# Patient Record
Sex: MALE | Race: WHITE | Employment: FULL TIME | URBAN - NONMETROPOLITAN AREA
[De-identification: names, ages, dates, MRNs, and addresses within clinical notes are randomized per-mention and may not be internally consistent; named-entity substitution may affect disease eponyms.]

---

## 2017-06-09 DIAGNOSIS — R60.9 DEPENDENT EDEMA: ICD-10-CM

## 2017-06-09 RX ORDER — FUROSEMIDE 40 MG/1
TABLET ORAL
Qty: 30 TABLET | Refills: 5 | Status: SHIPPED | OUTPATIENT
Start: 2017-06-09 | End: 2017-06-20 | Stop reason: SDUPTHER

## 2017-06-20 ENCOUNTER — OFFICE VISIT (OUTPATIENT)
Dept: FAMILY MEDICINE CLINIC | Age: 62
End: 2017-06-20

## 2017-06-20 VITALS
RESPIRATION RATE: 12 BRPM | BODY MASS INDEX: 45.1 KG/M2 | HEIGHT: 70 IN | TEMPERATURE: 97.7 F | SYSTOLIC BLOOD PRESSURE: 128 MMHG | WEIGHT: 315 LBS | DIASTOLIC BLOOD PRESSURE: 78 MMHG | HEART RATE: 64 BPM

## 2017-06-20 DIAGNOSIS — R63.5 WEIGHT GAIN: ICD-10-CM

## 2017-06-20 DIAGNOSIS — R06.09 EXERTIONAL DYSPNEA: ICD-10-CM

## 2017-06-20 DIAGNOSIS — E66.01 MORBID OBESITY WITH BMI OF 50.0-59.9, ADULT (HCC): ICD-10-CM

## 2017-06-20 DIAGNOSIS — Z13.31 POSITIVE DEPRESSION SCREENING: ICD-10-CM

## 2017-06-20 DIAGNOSIS — R60.9 DEPENDENT EDEMA: ICD-10-CM

## 2017-06-20 DIAGNOSIS — J44.9 CHRONIC OBSTRUCTIVE PULMONARY DISEASE, UNSPECIFIED COPD TYPE (HCC): ICD-10-CM

## 2017-06-20 DIAGNOSIS — L73.2 HIDRADENITIS SUPPURATIVA: Primary | ICD-10-CM

## 2017-06-20 DIAGNOSIS — I89.0 LYMPHEDEMA: ICD-10-CM

## 2017-06-20 DIAGNOSIS — J43.2 CENTRILOBULAR EMPHYSEMA (HCC): ICD-10-CM

## 2017-06-20 PROCEDURE — 4004F PT TOBACCO SCREEN RCVD TLK: CPT | Performed by: FAMILY MEDICINE

## 2017-06-20 PROCEDURE — G8427 DOCREV CUR MEDS BY ELIG CLIN: HCPCS | Performed by: FAMILY MEDICINE

## 2017-06-20 PROCEDURE — G8431 POS CLIN DEPRES SCRN F/U DOC: HCPCS | Performed by: FAMILY MEDICINE

## 2017-06-20 PROCEDURE — 3023F SPIROM DOC REV: CPT | Performed by: FAMILY MEDICINE

## 2017-06-20 PROCEDURE — G8926 SPIRO NO PERF OR DOC: HCPCS | Performed by: FAMILY MEDICINE

## 2017-06-20 PROCEDURE — G8417 CALC BMI ABV UP PARAM F/U: HCPCS | Performed by: FAMILY MEDICINE

## 2017-06-20 PROCEDURE — 99214 OFFICE O/P EST MOD 30 MIN: CPT | Performed by: FAMILY MEDICINE

## 2017-06-20 PROCEDURE — 3017F COLORECTAL CA SCREEN DOC REV: CPT | Performed by: FAMILY MEDICINE

## 2017-06-20 RX ORDER — PREGABALIN 75 MG/1
75 CAPSULE ORAL 2 TIMES DAILY
COMMUNITY
End: 2017-12-20 | Stop reason: ALTCHOICE

## 2017-06-20 RX ORDER — CLINDAMYCIN PHOSPHATE 11.9 MG/ML
SOLUTION TOPICAL
Qty: 60 ML | Refills: 5 | Status: SHIPPED | OUTPATIENT
Start: 2017-06-20 | End: 2017-07-20

## 2017-06-20 RX ORDER — FUROSEMIDE 40 MG/1
40 TABLET ORAL 3 TIMES DAILY
Qty: 60 TABLET | Refills: 5 | COMMUNITY
Start: 2017-06-20 | End: 2017-12-20

## 2017-06-20 ASSESSMENT — PATIENT HEALTH QUESTIONNAIRE - PHQ9
4. FEELING TIRED OR HAVING LITTLE ENERGY: 3
SUM OF ALL RESPONSES TO PHQ QUESTIONS 1-9: 14
3. TROUBLE FALLING OR STAYING ASLEEP: 3
8. MOVING OR SPEAKING SO SLOWLY THAT OTHER PEOPLE COULD HAVE NOTICED. OR THE OPPOSITE, BEING SO FIGETY OR RESTLESS THAT YOU HAVE BEEN MOVING AROUND A LOT MORE THAN USUAL: 1
SUM OF ALL RESPONSES TO PHQ9 QUESTIONS 1 & 2: 4
1. LITTLE INTEREST OR PLEASURE IN DOING THINGS: 3
6. FEELING BAD ABOUT YOURSELF - OR THAT YOU ARE A FAILURE OR HAVE LET YOURSELF OR YOUR FAMILY DOWN: 1
2. FEELING DOWN, DEPRESSED OR HOPELESS: 1
7. TROUBLE CONCENTRATING ON THINGS, SUCH AS READING THE NEWSPAPER OR WATCHING TELEVISION: 1
10. IF YOU CHECKED OFF ANY PROBLEMS, HOW DIFFICULT HAVE THESE PROBLEMS MADE IT FOR YOU TO DO YOUR WORK, TAKE CARE OF THINGS AT HOME, OR GET ALONG WITH OTHER PEOPLE: 1
5. POOR APPETITE OR OVEREATING: 1

## 2017-07-29 LAB
ANION GAP SERPL CALCULATED.3IONS-SCNC: 9 MMOL/L
BUN BLDV-MCNC: 20 MG/DL (ref 10–20)
CALCIUM SERPL-MCNC: 9.3 MG/DL (ref 8.7–10.8)
CHLORIDE BLD-SCNC: 103 MMOL/L (ref 95–111)
CO2: 29 MMOL/L (ref 21–32)
CREAT SERPL-MCNC: 0.8 MG/DL (ref 0.5–1.3)
EGFR AFRICAN AMERICAN: 119
EGFR IF NONAFRICAN AMERICAN: 99
GLUCOSE: 138 MG/DL (ref 70–100)
POTASSIUM SERPL-SCNC: 4.2 MMOL/L (ref 3.5–5.4)
SODIUM BLD-SCNC: 137 MMOL/L (ref 134–147)

## 2017-07-30 LAB — PRO-BNP: 43 PG/ML

## 2017-07-31 ENCOUNTER — TELEPHONE (OUTPATIENT)
Dept: FAMILY MEDICINE CLINIC | Age: 62
End: 2017-07-31

## 2017-07-31 DIAGNOSIS — R73.9 HYPERGLYCEMIA: Primary | ICD-10-CM

## 2017-09-15 LAB
AVERAGE GLUCOSE: 137 MG/DL (ref 66–114)
HBA1C MFR BLD: 6.4 % (ref 4.2–5.8)

## 2017-09-18 ENCOUNTER — TELEPHONE (OUTPATIENT)
Dept: FAMILY MEDICINE CLINIC | Age: 62
End: 2017-09-18

## 2017-12-20 ENCOUNTER — OFFICE VISIT (OUTPATIENT)
Dept: FAMILY MEDICINE CLINIC | Age: 62
End: 2017-12-20
Payer: MEDICARE

## 2017-12-20 VITALS
BODY MASS INDEX: 45.1 KG/M2 | RESPIRATION RATE: 28 BRPM | SYSTOLIC BLOOD PRESSURE: 136 MMHG | HEART RATE: 96 BPM | WEIGHT: 315 LBS | TEMPERATURE: 97.8 F | DIASTOLIC BLOOD PRESSURE: 82 MMHG | HEIGHT: 70 IN

## 2017-12-20 DIAGNOSIS — I10 HTN, GOAL BELOW 140/90: Primary | ICD-10-CM

## 2017-12-20 DIAGNOSIS — L73.2 HIDRADENITIS SUPPURATIVA: ICD-10-CM

## 2017-12-20 DIAGNOSIS — M54.16 LUMBAR RADICULOPATHY: ICD-10-CM

## 2017-12-20 DIAGNOSIS — R60.9 DEPENDENT EDEMA: ICD-10-CM

## 2017-12-20 PROCEDURE — 1036F TOBACCO NON-USER: CPT | Performed by: FAMILY MEDICINE

## 2017-12-20 PROCEDURE — G8417 CALC BMI ABV UP PARAM F/U: HCPCS | Performed by: FAMILY MEDICINE

## 2017-12-20 PROCEDURE — G8427 DOCREV CUR MEDS BY ELIG CLIN: HCPCS | Performed by: FAMILY MEDICINE

## 2017-12-20 PROCEDURE — G8482 FLU IMMUNIZE ORDER/ADMIN: HCPCS | Performed by: FAMILY MEDICINE

## 2017-12-20 PROCEDURE — 3017F COLORECTAL CA SCREEN DOC REV: CPT | Performed by: FAMILY MEDICINE

## 2017-12-20 PROCEDURE — 99214 OFFICE O/P EST MOD 30 MIN: CPT | Performed by: FAMILY MEDICINE

## 2017-12-20 RX ORDER — LISINOPRIL 10 MG/1
10 TABLET ORAL DAILY
COMMUNITY
End: 2017-12-20 | Stop reason: SDUPTHER

## 2017-12-20 RX ORDER — LISINOPRIL 10 MG/1
10 TABLET ORAL DAILY
Qty: 30 TABLET | Refills: 5 | Status: SHIPPED | OUTPATIENT
Start: 2017-12-20 | End: 2018-06-18 | Stop reason: SDUPTHER

## 2017-12-20 RX ORDER — ALBUTEROL SULFATE 90 UG/1
2 AEROSOL, METERED RESPIRATORY (INHALATION) EVERY 6 HOURS PRN
COMMUNITY
End: 2018-07-03 | Stop reason: SDUPTHER

## 2017-12-20 RX ORDER — TRAZODONE HYDROCHLORIDE 50 MG/1
50 TABLET ORAL NIGHTLY
COMMUNITY
End: 2019-03-21

## 2017-12-20 RX ORDER — BUMETANIDE 1 MG/1
1 TABLET ORAL 2 TIMES DAILY
Qty: 60 TABLET | Refills: 3 | Status: SHIPPED | OUTPATIENT
Start: 2017-12-20 | End: 2018-01-10 | Stop reason: SDUPTHER

## 2017-12-20 RX ORDER — AMITRIPTYLINE HYDROCHLORIDE 50 MG/1
50 TABLET, FILM COATED ORAL NIGHTLY
Qty: 30 TABLET | Refills: 5 | Status: SHIPPED | OUTPATIENT
Start: 2017-12-20 | End: 2019-03-21

## 2017-12-20 RX ORDER — AMITRIPTYLINE HYDROCHLORIDE 25 MG/1
50 TABLET, FILM COATED ORAL NIGHTLY
COMMUNITY
End: 2017-12-20 | Stop reason: SDUPTHER

## 2017-12-20 RX ORDER — SULFAMETHOXAZOLE AND TRIMETHOPRIM 800; 160 MG/1; MG/1
1 TABLET ORAL DAILY
Qty: 42 TABLET | Refills: 0 | Status: SHIPPED | OUTPATIENT
Start: 2017-12-20 | End: 2018-01-31

## 2017-12-20 RX ORDER — BACLOFEN 10 MG/1
10 TABLET ORAL EVERY EVENING
COMMUNITY
End: 2021-05-26

## 2017-12-20 NOTE — PROGRESS NOTES
Visit Information    Have you changed or started any medications since your last visit including any over-the-counter medicines, vitamins, or herbal medicines? no   Are you having any side effects from any of your medications? -  no  Have you stopped taking any of your medications? Is so, why? -  no    Have you seen any other physician or provider since your last visit? Yes - Records Obtained  Have you had any other diagnostic tests since your last visit? Yes - Records Obtained  Have you been seen in the emergency room and/or had an admission to a hospital since we last saw you? Yes - Records Obtained  Have you had your routine dental cleaning in the past 6 months? no    Have you activated your [employer] account? If not, what are your barriers? No     Patient Care Team:  Max Sever, DO as PCP - General (Family Medicine)  Max Sever,  as PCP - [facility] Attributed Provider    Medical History Review  Past Medical, Family, and Social History     Defer to provider.

## 2017-12-20 NOTE — PROGRESS NOTES
HISTORY      Had face surgery at [address] [place] [address] Left 2007    Dr. Marcelino Agent History   Substance Use Topics    Smoking status: Former Smoker     Packs/day: 0.50     Years: 40.00     Types: Cigarettes     Start date: 6/10/1974     Quit date: 3/20/2017    Smokeless tobacco: Never Used      Comment: information given at past appts    Alcohol use Yes      Comment: very little       Family History   Problem Relation Age of Onset    Arthritis Father     Stroke Father     Heart Attack Father          I have reviewed the patient's past medical history, past surgical history, allergies, medications, social and family history and I have made updates where appropriate. ROS        PHYSICAL EXAM:  /82   Pulse 96   Temp 97.8 °F (36.6 °C) (Oral)   Resp 28   Ht 5' 10\" (1.778 m)   Wt (!) 424 lb (192.3 kg)   BMI 60.84 kg/m²     General Appearance: well developed and well- nourished, in no acute distress  Head: normocephalic and atraumatic  ENT: external ear and ear canal normal bilaterally, nose without deformity, nasal mucosa and turbinates normal without polyps, oropharynx normal, dentition is normal for age, no lip or gum lesions noted  Neck: supple and non-tender without mass, no thyromegaly or thyroid nodules, no cervical lymphadenopathy  Pulmonary/Chest: clear to auscultation bilaterally- no wheezes, rales or rhonchi, normal air movement, no respiratory distress or retractions  Cardiovascular: normal rate, regular rhythm, normal S1 and S2, no murmurs, rubs, clicks, or gallops, distal pulses intact  Extremities: no cyanosis, clubbing or edema of the lower extremities  Psych:  Normal affect without evidence of depression or anxiety, insight and judgement are appropriate, memory appears intact  Skin: warm and dry, no rash or erythema, 1.1 cm flesh color      ASSESSMENT & PLAN  Kenzie Walter was seen today for 6 month follow-up, other, leg pain and other.     Diagnoses and all orders for this visit:    HTN, goal below 140/90  -     lisinopril (PRINIVIL;ZESTRIL) 10 MG tablet; Take 1 tablet by mouth daily    Dependent edema  -     bumetanide (BUMEX) 1 MG tablet; Take 1 tablet by mouth 2 times daily    Hidradenitis suppurativa  -     sulfamethoxazole-trimethoprim (BACTRIM DS) 800-160 MG per tablet; Take 1 tablet by mouth daily    Lumbar radiculopathy  -     amitriptyline (ELAVIL) 50 MG tablet; Take 1 tablet by mouth nightly  -     Handicap Placard MISC; by Does not apply route Expires 12/20/2022    -He feels like the lasix is no longer effective, will trial switching to Bumex and recheck in 3 weeks  -Will remove skin lesion at next visit  -Stop topical clindamycin, will start daily Bactrim x 6 weeks  -Recheck in 3 weeks    Return in about 3 weeks (around 1/10/2018), or if symptoms worsen or fail to improve. Juanis Nichols received counseling on the following healthy behaviors: medication adherence  Reviewed prior labs and health maintenance. Continue current medications, diet and exercise. Discussed use, benefit, and side effects of prescribed medications. Barriers to medication compliance addressed. Patient given educational materials - see patient instructions. All patient questions answered. Patient voiced understanding.

## 2017-12-20 NOTE — PROGRESS NOTES
Subjective:      Patient ID: Daniel Castro is a 63 y.o. male.     HPI    Review of Systems    Objective:   Physical Exam    Assessment:      ***      Plan:      ***

## 2018-01-10 ENCOUNTER — OFFICE VISIT (OUTPATIENT)
Dept: FAMILY MEDICINE CLINIC | Age: 63
End: 2018-01-10
Payer: MEDICARE

## 2018-01-10 VITALS
BODY MASS INDEX: 45.1 KG/M2 | RESPIRATION RATE: 14 BRPM | TEMPERATURE: 98.1 F | HEART RATE: 76 BPM | DIASTOLIC BLOOD PRESSURE: 92 MMHG | HEIGHT: 70 IN | SYSTOLIC BLOOD PRESSURE: 136 MMHG | WEIGHT: 315 LBS

## 2018-01-10 DIAGNOSIS — R60.9 DEPENDENT EDEMA: ICD-10-CM

## 2018-01-10 DIAGNOSIS — E66.01 MORBID OBESITY WITH BMI OF 60.0-69.9, ADULT (HCC): ICD-10-CM

## 2018-01-10 DIAGNOSIS — J44.9 COPD WITH CHRONIC BRONCHITIS (HCC): ICD-10-CM

## 2018-01-10 DIAGNOSIS — L98.9 SKIN LESION OF NECK: Primary | ICD-10-CM

## 2018-01-10 PROCEDURE — G8482 FLU IMMUNIZE ORDER/ADMIN: HCPCS | Performed by: FAMILY MEDICINE

## 2018-01-10 PROCEDURE — G8926 SPIRO NO PERF OR DOC: HCPCS | Performed by: FAMILY MEDICINE

## 2018-01-10 PROCEDURE — 3017F COLORECTAL CA SCREEN DOC REV: CPT | Performed by: FAMILY MEDICINE

## 2018-01-10 PROCEDURE — G8417 CALC BMI ABV UP PARAM F/U: HCPCS | Performed by: FAMILY MEDICINE

## 2018-01-10 PROCEDURE — 11312 SHAVE SKIN LESION 1.1-2.0 CM: CPT | Performed by: FAMILY MEDICINE

## 2018-01-10 PROCEDURE — 99213 OFFICE O/P EST LOW 20 MIN: CPT | Performed by: FAMILY MEDICINE

## 2018-01-10 PROCEDURE — G8428 CUR MEDS NOT DOCUMENT: HCPCS | Performed by: FAMILY MEDICINE

## 2018-01-10 PROCEDURE — 3023F SPIROM DOC REV: CPT | Performed by: FAMILY MEDICINE

## 2018-01-10 PROCEDURE — 1036F TOBACCO NON-USER: CPT | Performed by: FAMILY MEDICINE

## 2018-01-10 RX ORDER — BUMETANIDE 1 MG/1
TABLET ORAL
Qty: 60 TABLET | Refills: 3 | Status: SHIPPED | OUTPATIENT
Start: 2018-01-10 | End: 2018-04-02 | Stop reason: SDUPTHER

## 2018-01-10 NOTE — PROGRESS NOTES
Visit Information    Have you changed or started any medications since your last visit including any over-the-counter medicines, vitamins, or herbal medicines? no   Are you having any side effects from any of your medications? -  no  Have you stopped taking any of your medications? Is so, why? -  no    Have you seen any other physician or provider since your last visit? No  Have you had any other diagnostic tests since your last visit? No  Have you been seen in the emergency room and/or had an admission to a hospital since we last saw you? No  Have you had your routine dental cleaning in the past 6 months? no    Have you activated your [employer] account? If not, what are your barriers? Yes     Patient Care Team:  Marilou Whiteside, DO as PCP - General (Family Medicine)  Marilou Whiteside, DO as PCP - [facility] Attributed Provider    Medical History Review  Past Medical, Family, and Social History     Defer to provider.

## 2018-01-12 ENCOUNTER — TELEPHONE (OUTPATIENT)
Dept: FAMILY MEDICINE CLINIC | Age: 63
End: 2018-01-12

## 2018-01-25 ENCOUNTER — TELEPHONE (OUTPATIENT)
Dept: FAMILY MEDICINE CLINIC | Age: 63
End: 2018-01-25

## 2018-01-25 DIAGNOSIS — R73.9 HYPERGLYCEMIA: Primary | ICD-10-CM

## 2018-01-25 LAB
ANION GAP SERPL CALCULATED.3IONS-SCNC: 11 MMOL/L
BUN BLDV-MCNC: 14 MG/DL (ref 10–20)
CALCIUM SERPL-MCNC: 9.3 MG/DL (ref 8.7–10.8)
CHLORIDE BLD-SCNC: 99 MMOL/L (ref 95–111)
CO2: 31 MMOL/L (ref 21–32)
CREAT SERPL-MCNC: 0.8 MG/DL (ref 0.5–1.3)
EGFR AFRICAN AMERICAN: 119
EGFR IF NONAFRICAN AMERICAN: 99
GLUCOSE: 245 MG/DL (ref 70–100)
POTASSIUM SERPL-SCNC: 4.3 MMOL/L (ref 3.5–5.4)
SODIUM BLD-SCNC: 137 MMOL/L (ref 134–147)

## 2018-01-25 NOTE — TELEPHONE ENCOUNTER
Pt informed, and requested to have lab order faxed to [address] [place] [address] lab at fax [phone]. Order faxed.

## 2018-02-01 ENCOUNTER — TELEPHONE (OUTPATIENT)
Dept: FAMILY MEDICINE CLINIC | Age: 63
End: 2018-02-01

## 2018-02-01 DIAGNOSIS — E11.9 TYPE 2 DIABETES MELLITUS WITHOUT COMPLICATION, WITHOUT LONG-TERM CURRENT USE OF INSULIN (HCC): Primary | ICD-10-CM

## 2018-02-01 LAB
AVERAGE GLUCOSE: 212 MG/DL (ref 66–114)
HBA1C MFR BLD: 9 % (ref 4.2–5.8)

## 2018-02-01 NOTE — TELEPHONE ENCOUNTER
Pt's wife Dariusz Barbosa (on signed hipaa) informed and verbalized understanding. appt scheduled for 1 month.

## 2018-03-05 ENCOUNTER — OFFICE VISIT (OUTPATIENT)
Dept: FAMILY MEDICINE CLINIC | Age: 63
End: 2018-03-05
Payer: MEDICARE

## 2018-03-05 VITALS
WEIGHT: 315 LBS | SYSTOLIC BLOOD PRESSURE: 138 MMHG | RESPIRATION RATE: 20 BRPM | DIASTOLIC BLOOD PRESSURE: 76 MMHG | BODY MASS INDEX: 45.1 KG/M2 | HEART RATE: 80 BPM | TEMPERATURE: 97.9 F | HEIGHT: 70 IN

## 2018-03-05 DIAGNOSIS — R60.9 DEPENDENT EDEMA: ICD-10-CM

## 2018-03-05 DIAGNOSIS — E11.29 MICROALBUMINURIA DUE TO TYPE 2 DIABETES MELLITUS (HCC): ICD-10-CM

## 2018-03-05 DIAGNOSIS — T50.905A ADVERSE EFFECT OF DRUG, INITIAL ENCOUNTER: ICD-10-CM

## 2018-03-05 DIAGNOSIS — R80.9 MICROALBUMINURIA DUE TO TYPE 2 DIABETES MELLITUS (HCC): ICD-10-CM

## 2018-03-05 DIAGNOSIS — E11.9 TYPE 2 DIABETES MELLITUS WITHOUT COMPLICATION, WITHOUT LONG-TERM CURRENT USE OF INSULIN (HCC): Primary | ICD-10-CM

## 2018-03-05 DIAGNOSIS — E66.01 MORBID OBESITY WITH BMI OF 50.0-59.9, ADULT (HCC): ICD-10-CM

## 2018-03-05 LAB
CREATININE URINE POCT: ABNORMAL
MICROALBUMIN/CREAT 24H UR: ABNORMAL MG/G{CREAT}
MICROALBUMIN/CREAT UR-RTO: ABNORMAL

## 2018-03-05 PROCEDURE — 1036F TOBACCO NON-USER: CPT | Performed by: FAMILY MEDICINE

## 2018-03-05 PROCEDURE — 3017F COLORECTAL CA SCREEN DOC REV: CPT | Performed by: FAMILY MEDICINE

## 2018-03-05 PROCEDURE — 99214 OFFICE O/P EST MOD 30 MIN: CPT | Performed by: FAMILY MEDICINE

## 2018-03-05 PROCEDURE — 82044 UR ALBUMIN SEMIQUANTITATIVE: CPT | Performed by: FAMILY MEDICINE

## 2018-03-05 PROCEDURE — 3045F PR MOST RECENT HEMOGLOBIN A1C LEVEL 7.0-9.0%: CPT | Performed by: FAMILY MEDICINE

## 2018-03-05 PROCEDURE — G8427 DOCREV CUR MEDS BY ELIG CLIN: HCPCS | Performed by: FAMILY MEDICINE

## 2018-03-05 PROCEDURE — G8482 FLU IMMUNIZE ORDER/ADMIN: HCPCS | Performed by: FAMILY MEDICINE

## 2018-03-05 PROCEDURE — G8417 CALC BMI ABV UP PARAM F/U: HCPCS | Performed by: FAMILY MEDICINE

## 2018-03-05 NOTE — PROGRESS NOTES
- Tdap) 05/02/1976    Shingles Vaccine (1 of 2 - 2 Dose Series) 05/02/2007    A1C test (Diabetic or Prediabetic)  04/29/2018    Potassium monitoring  01/24/2019    Creatinine monitoring  01/24/2019    Colon cancer screen colonoscopy  04/17/2020    Flu vaccine  Completed    Pneumococcal med risk  Completed       Past Medical History:   Diagnosis Date    Asthma     CHF (congestive heart failure) (HCC)     Chronic back pain     Chronic neck pain     Obesity        Past Surgical History:   Procedure Laterality Date    COLONOSCOPY  4/17/15    OTHER SURGICAL HISTORY      Had face surgery at [address] [place] [address] 2007    Dr. Aditi King       Social History   Substance Use Topics    Smoking status: Former Smoker     Packs/day: 0.50     Years: 40.00     Types: Cigarettes     Start date: 6/10/1974     Quit date: 3/20/2017    Smokeless tobacco: Never Used      Comment: information given at past appts    Alcohol use Yes      Comment: very little       Family History   Problem Relation Age of Onset    Arthritis Father     Stroke Father     Heart Attack Father          I have reviewed the patient's past medical history, past surgical history, allergies, medications, social and family history and I have made updates where appropriate.     ROS        PHYSICAL EXAM:  /76   Pulse 80   Temp 97.9 °F (36.6 °C) (Oral)   Resp 20   Ht 5' 10\" (1.778 m)   Wt (!) 417 lb (189.1 kg)   BMI 59.83 kg/m²     General Appearance: well developed and well- nourished, in no acute distress  Head: normocephalic and atraumatic  ENT: external ear and ear canal normal bilaterally, nose without deformity, nasal mucosa and turbinates normal without polyps, oropharynx normal, dentition is normal for age, no lip or gum lesions noted  Neck: supple and non-tender without mass, no thyromegaly or thyroid nodules, no cervical lymphadenopathy  Pulmonary/Chest: clear to auscultation bilaterally- no wheezes, rales or rhonchi, normal air movement, no respiratory distress or retractions  Cardiovascular: normal rate, regular rhythm, normal S1 and S2, no murmurs, rubs, clicks, or gallops, distal pulses intact  Extremities: no cyanosis, clubbing of the lower extremities, chronic severe pitting edema of the LEs bilaterally  Psych:  Normal affect without evidence of depression or anxiety, insight and judgement are appropriate, memory appears intact  Skin: warm and dry, no rash or erythema      ASSESSMENT & PLAN  Clare Iraheta was seen today for follow-up. Diagnoses and all orders for this visit:    Type 2 diabetes mellitus without complication, without long-term current use of insulin ([facility])  -     POCT microalbumin    Microalbuminuria due to type 2 diabetes mellitus ([facility] [address]    Morbid obesity with BMI of 50.0-59.9, adult ([facility])    Adverse effect of drug, initial encounter    Dependent edema    -Continue Bumex at BID dosing for now  -Due to diarrhea from metformin, advised to cut back to 500mg BID, recheck a1c in 2 months  -Long discussion today with patient regarding his weight. We did discuss possible referral to weight management. He wants to hold off on this now, but is not ruling it out if he is not able to make significant progress on his own. Return in about 2 months (around 5/5/2018). Clare Iraheta received counseling on the following healthy behaviors: medication adherence  Reviewed prior labs and health maintenance. Continue current medications, diet and exercise. Discussed use, benefit, and side effects of prescribed medications. Barriers to medication compliance addressed. Patient given educational materials - see patient instructions. All patient questions answered. Patient voiced understanding.

## 2018-04-02 DIAGNOSIS — R60.9 DEPENDENT EDEMA: ICD-10-CM

## 2018-04-02 RX ORDER — BUMETANIDE 1 MG/1
TABLET ORAL
Qty: 90 TABLET | Refills: 3 | Status: SHIPPED | OUTPATIENT
Start: 2018-04-02 | End: 2018-07-28 | Stop reason: SDUPTHER

## 2018-05-03 ENCOUNTER — OFFICE VISIT (OUTPATIENT)
Dept: FAMILY MEDICINE CLINIC | Age: 63
End: 2018-05-03
Payer: MEDICARE

## 2018-05-03 VITALS
TEMPERATURE: 98 F | HEART RATE: 71 BPM | SYSTOLIC BLOOD PRESSURE: 128 MMHG | HEIGHT: 70 IN | BODY MASS INDEX: 45.1 KG/M2 | WEIGHT: 315 LBS | RESPIRATION RATE: 20 BRPM | DIASTOLIC BLOOD PRESSURE: 89 MMHG

## 2018-05-03 DIAGNOSIS — E11.9 TYPE 2 DIABETES MELLITUS WITHOUT COMPLICATION, WITHOUT LONG-TERM CURRENT USE OF INSULIN (HCC): Primary | ICD-10-CM

## 2018-05-03 DIAGNOSIS — E66.1 CLASS 3 DRUG-INDUCED OBESITY WITH SERIOUS COMORBIDITY AND BODY MASS INDEX (BMI) OF 60.0 TO 69.9 IN ADULT (HCC): ICD-10-CM

## 2018-05-03 DIAGNOSIS — R60.9 DEPENDENT EDEMA: ICD-10-CM

## 2018-05-03 LAB — HBA1C MFR BLD: 7.5 %

## 2018-05-03 PROCEDURE — 3017F COLORECTAL CA SCREEN DOC REV: CPT | Performed by: FAMILY MEDICINE

## 2018-05-03 PROCEDURE — 83036 HEMOGLOBIN GLYCOSYLATED A1C: CPT | Performed by: FAMILY MEDICINE

## 2018-05-03 PROCEDURE — 1036F TOBACCO NON-USER: CPT | Performed by: FAMILY MEDICINE

## 2018-05-03 PROCEDURE — G8417 CALC BMI ABV UP PARAM F/U: HCPCS | Performed by: FAMILY MEDICINE

## 2018-05-03 PROCEDURE — G8427 DOCREV CUR MEDS BY ELIG CLIN: HCPCS | Performed by: FAMILY MEDICINE

## 2018-05-03 PROCEDURE — 3045F PR MOST RECENT HEMOGLOBIN A1C LEVEL 7.0-9.0%: CPT | Performed by: FAMILY MEDICINE

## 2018-05-03 PROCEDURE — 99214 OFFICE O/P EST MOD 30 MIN: CPT | Performed by: FAMILY MEDICINE

## 2018-05-03 PROCEDURE — 2022F DILAT RTA XM EVC RTNOPTHY: CPT | Performed by: FAMILY MEDICINE

## 2018-06-14 ENCOUNTER — OFFICE VISIT (OUTPATIENT)
Dept: FAMILY MEDICINE CLINIC | Age: 63
End: 2018-06-14
Payer: MEDICARE

## 2018-06-14 VITALS
SYSTOLIC BLOOD PRESSURE: 140 MMHG | TEMPERATURE: 98 F | WEIGHT: 315 LBS | BODY MASS INDEX: 45.1 KG/M2 | DIASTOLIC BLOOD PRESSURE: 89 MMHG | HEART RATE: 72 BPM | RESPIRATION RATE: 24 BRPM | HEIGHT: 70 IN

## 2018-06-14 DIAGNOSIS — R53.83 OTHER FATIGUE: ICD-10-CM

## 2018-06-14 DIAGNOSIS — R60.0 LOWER EXTREMITY EDEMA: ICD-10-CM

## 2018-06-14 DIAGNOSIS — R06.02 SHORTNESS OF BREATH: Primary | ICD-10-CM

## 2018-06-14 PROCEDURE — G8417 CALC BMI ABV UP PARAM F/U: HCPCS | Performed by: FAMILY MEDICINE

## 2018-06-14 PROCEDURE — 3017F COLORECTAL CA SCREEN DOC REV: CPT | Performed by: FAMILY MEDICINE

## 2018-06-14 PROCEDURE — 99214 OFFICE O/P EST MOD 30 MIN: CPT | Performed by: FAMILY MEDICINE

## 2018-06-14 PROCEDURE — G8427 DOCREV CUR MEDS BY ELIG CLIN: HCPCS | Performed by: FAMILY MEDICINE

## 2018-06-14 PROCEDURE — 1036F TOBACCO NON-USER: CPT | Performed by: FAMILY MEDICINE

## 2018-06-14 PROCEDURE — 93000 ELECTROCARDIOGRAM COMPLETE: CPT | Performed by: FAMILY MEDICINE

## 2018-06-14 RX ORDER — PREGABALIN 50 MG/1
75 CAPSULE ORAL 3 TIMES DAILY
COMMUNITY
End: 2022-01-04

## 2018-06-15 ENCOUNTER — TELEPHONE (OUTPATIENT)
Dept: FAMILY MEDICINE CLINIC | Age: 63
End: 2018-06-15

## 2018-06-18 DIAGNOSIS — I10 HTN, GOAL BELOW 140/90: ICD-10-CM

## 2018-06-18 RX ORDER — LISINOPRIL 10 MG/1
10 TABLET ORAL DAILY
Qty: 30 TABLET | Refills: 5 | Status: SHIPPED | OUTPATIENT
Start: 2018-06-18 | End: 2018-12-10 | Stop reason: SDUPTHER

## 2018-06-26 LAB
ANION GAP SERPL CALCULATED.3IONS-SCNC: 12 MEQ/L (ref 10–19)
BUN BLDV-MCNC: 11 MG/DL (ref 8–23)
CALCIUM SERPL-MCNC: 8.5 MG/DL (ref 8.5–10.5)
CHLORIDE BLD-SCNC: 103 MEQ/L (ref 95–107)
CO2: 25 MEQ/L (ref 19–31)
CREAT SERPL-MCNC: 0.6 MG/DL (ref 0.8–1.4)
EGFR AFRICAN AMERICAN: 125.8 ML/MIN/1.73 M2
EGFR IF NONAFRICAN AMERICAN: 108.5 ML/MIN/1.73 M2
GLUCOSE: 119 MG/DL (ref 70–99)
POTASSIUM SERPL-SCNC: 4.5 MEQ/L (ref 3.5–5.4)
SODIUM BLD-SCNC: 140 MEQ/L (ref 135–146)
TSH SERPL DL<=0.05 MIU/L-ACNC: 2.44 UIU/ML (ref 0.4–4.1)

## 2018-06-27 ENCOUNTER — TELEPHONE (OUTPATIENT)
Dept: FAMILY MEDICINE CLINIC | Age: 63
End: 2018-06-27

## 2018-06-27 LAB — B-TYPE NATRIURETIC PEPTIDE: 53 PG/ML (ref 0–100)

## 2018-07-02 ENCOUNTER — TELEPHONE (OUTPATIENT)
Dept: FAMILY MEDICINE CLINIC | Age: 63
End: 2018-07-02

## 2018-07-02 DIAGNOSIS — R06.09 EXERTIONAL DYSPNEA: Primary | ICD-10-CM

## 2018-07-03 ENCOUNTER — TELEPHONE (OUTPATIENT)
Dept: FAMILY MEDICINE CLINIC | Age: 63
End: 2018-07-03

## 2018-07-03 DIAGNOSIS — J44.9 CHRONIC OBSTRUCTIVE PULMONARY DISEASE, UNSPECIFIED COPD TYPE (HCC): Primary | ICD-10-CM

## 2018-07-03 RX ORDER — ALBUTEROL SULFATE 90 UG/1
2 AEROSOL, METERED RESPIRATORY (INHALATION) EVERY 6 HOURS PRN
Qty: 1 INHALER | Refills: 5 | Status: SHIPPED | OUTPATIENT
Start: 2018-07-03 | End: 2018-07-03

## 2018-07-03 RX ORDER — ALBUTEROL SULFATE 90 UG/1
2 AEROSOL, METERED RESPIRATORY (INHALATION) EVERY 6 HOURS PRN
Qty: 3 INHALER | Refills: 3 | Status: SHIPPED | OUTPATIENT
Start: 2018-07-03 | End: 2019-05-28 | Stop reason: SDUPTHER

## 2018-07-16 ENCOUNTER — TELEPHONE (OUTPATIENT)
Dept: FAMILY MEDICINE CLINIC | Age: 63
End: 2018-07-16

## 2018-07-16 NOTE — TELEPHONE ENCOUNTER
----- Message from Allie Perez DO sent at 7/16/2018 12:11 PM EDT -----  Please let pt know that chest xray is clear. Let me know if questions, thanks!

## 2018-07-18 ENCOUNTER — TELEPHONE (OUTPATIENT)
Dept: CARE COORDINATION | Age: 63
End: 2018-07-18

## 2018-07-28 DIAGNOSIS — E11.9 TYPE 2 DIABETES MELLITUS WITHOUT COMPLICATION, WITHOUT LONG-TERM CURRENT USE OF INSULIN (HCC): ICD-10-CM

## 2018-07-28 DIAGNOSIS — R60.9 DEPENDENT EDEMA: ICD-10-CM

## 2018-07-28 RX ORDER — BUMETANIDE 1 MG/1
TABLET ORAL
Qty: 90 TABLET | Refills: 3 | Status: SHIPPED | OUTPATIENT
Start: 2018-07-28 | End: 2018-11-29 | Stop reason: SDUPTHER

## 2018-09-04 ENCOUNTER — TELEPHONE (OUTPATIENT)
Dept: FAMILY MEDICINE CLINIC | Age: 63
End: 2018-09-04

## 2018-09-04 ENCOUNTER — OFFICE VISIT (OUTPATIENT)
Dept: FAMILY MEDICINE CLINIC | Age: 63
End: 2018-09-04
Payer: MEDICARE

## 2018-09-04 VITALS
DIASTOLIC BLOOD PRESSURE: 84 MMHG | WEIGHT: 315 LBS | BODY MASS INDEX: 44.1 KG/M2 | HEART RATE: 68 BPM | HEIGHT: 71 IN | RESPIRATION RATE: 14 BRPM | TEMPERATURE: 98.1 F | SYSTOLIC BLOOD PRESSURE: 138 MMHG

## 2018-09-04 DIAGNOSIS — R60.9 DEPENDENT EDEMA: ICD-10-CM

## 2018-09-04 DIAGNOSIS — E66.01 MORBID OBESITY WITH BMI OF 50.0-59.9, ADULT (HCC): ICD-10-CM

## 2018-09-04 DIAGNOSIS — I10 HTN, GOAL BELOW 140/90: ICD-10-CM

## 2018-09-04 DIAGNOSIS — E11.9 TYPE 2 DIABETES MELLITUS WITHOUT COMPLICATION, WITHOUT LONG-TERM CURRENT USE OF INSULIN (HCC): Primary | ICD-10-CM

## 2018-09-04 LAB — HBA1C MFR BLD: 6.1 %

## 2018-09-04 PROCEDURE — 3044F HG A1C LEVEL LT 7.0%: CPT | Performed by: FAMILY MEDICINE

## 2018-09-04 PROCEDURE — 2022F DILAT RTA XM EVC RTNOPTHY: CPT | Performed by: FAMILY MEDICINE

## 2018-09-04 PROCEDURE — G8427 DOCREV CUR MEDS BY ELIG CLIN: HCPCS | Performed by: FAMILY MEDICINE

## 2018-09-04 PROCEDURE — G8417 CALC BMI ABV UP PARAM F/U: HCPCS | Performed by: FAMILY MEDICINE

## 2018-09-04 PROCEDURE — 99214 OFFICE O/P EST MOD 30 MIN: CPT | Performed by: FAMILY MEDICINE

## 2018-09-04 PROCEDURE — 1036F TOBACCO NON-USER: CPT | Performed by: FAMILY MEDICINE

## 2018-09-04 PROCEDURE — 83036 HEMOGLOBIN GLYCOSYLATED A1C: CPT | Performed by: FAMILY MEDICINE

## 2018-09-04 PROCEDURE — 3017F COLORECTAL CA SCREEN DOC REV: CPT | Performed by: FAMILY MEDICINE

## 2018-09-04 ASSESSMENT — PATIENT HEALTH QUESTIONNAIRE - PHQ9
2. FEELING DOWN, DEPRESSED OR HOPELESS: 1
SUM OF ALL RESPONSES TO PHQ9 QUESTIONS 1 & 2: 2
SUM OF ALL RESPONSES TO PHQ QUESTIONS 1-9: 2
1. LITTLE INTEREST OR PLEASURE IN DOING THINGS: 1
SUM OF ALL RESPONSES TO PHQ QUESTIONS 1-9: 2

## 2018-10-02 ENCOUNTER — CARE COORDINATION (OUTPATIENT)
Dept: CARE COORDINATION | Age: 63
End: 2018-10-02

## 2018-11-28 ENCOUNTER — CARE COORDINATION (OUTPATIENT)
Dept: CARE COORDINATION | Age: 63
End: 2018-11-28

## 2018-11-29 ENCOUNTER — TELEPHONE (OUTPATIENT)
Dept: FAMILY MEDICINE CLINIC | Age: 63
End: 2018-11-29

## 2018-11-29 ENCOUNTER — CARE COORDINATION (OUTPATIENT)
Dept: CARE COORDINATION | Age: 63
End: 2018-11-29

## 2018-11-29 DIAGNOSIS — R60.9 DEPENDENT EDEMA: ICD-10-CM

## 2018-11-29 RX ORDER — BUMETANIDE 1 MG/1
TABLET ORAL
Qty: 90 TABLET | Refills: 5 | Status: SHIPPED | OUTPATIENT
Start: 2018-11-29 | End: 2019-05-31 | Stop reason: SDUPTHER

## 2018-11-29 NOTE — TELEPHONE ENCOUNTER
11/29/18  [facility] received an alert and spoke to patient daughter who said patient is having slight SOB, increased bilateral lower leg edema (very mushy per daughter), constipation and weight gain, however they are not weighing him. Patient is taking Lyrica thru [employer]  who denied any association with symptoms. Odell Goodell advised that she was unable to tell patient to DC the medicine. She scheduled pt for 12/20/18  (declined sooner) with [facility].   Shawn/shani  Dolv: 9/4/18

## 2018-11-29 NOTE — TELEPHONE ENCOUNTER
Patients pharmacy [facility] sent a refill request for the patients Bumetanide 1 mg tablets. Patient takes 2 tablets by mouth every morning and 1 tablet by mouth at bedtime. Please advise.

## 2018-12-10 DIAGNOSIS — I10 HTN, GOAL BELOW 140/90: ICD-10-CM

## 2018-12-10 RX ORDER — LISINOPRIL 10 MG/1
10 TABLET ORAL DAILY
Qty: 90 TABLET | Refills: 3 | Status: SHIPPED | OUTPATIENT
Start: 2018-12-10 | End: 2022-10-21

## 2018-12-20 ENCOUNTER — OFFICE VISIT (OUTPATIENT)
Dept: FAMILY MEDICINE CLINIC | Age: 63
End: 2018-12-20
Payer: MEDICARE

## 2018-12-20 VITALS
WEIGHT: 315 LBS | RESPIRATION RATE: 18 BRPM | HEIGHT: 72 IN | DIASTOLIC BLOOD PRESSURE: 84 MMHG | SYSTOLIC BLOOD PRESSURE: 136 MMHG | TEMPERATURE: 98.3 F | BODY MASS INDEX: 42.66 KG/M2 | HEART RATE: 88 BPM

## 2018-12-20 DIAGNOSIS — E66.01 OBESITY, MORBID, BMI 50 OR HIGHER (HCC): ICD-10-CM

## 2018-12-20 DIAGNOSIS — L73.2 HIDRADENITIS SUPPURATIVA: Primary | ICD-10-CM

## 2018-12-20 DIAGNOSIS — R60.0 LOWER EXTREMITY EDEMA: ICD-10-CM

## 2018-12-20 DIAGNOSIS — E11.9 TYPE 2 DIABETES MELLITUS WITHOUT COMPLICATION, WITHOUT LONG-TERM CURRENT USE OF INSULIN (HCC): ICD-10-CM

## 2018-12-20 PROCEDURE — 2022F DILAT RTA XM EVC RTNOPTHY: CPT | Performed by: FAMILY MEDICINE

## 2018-12-20 PROCEDURE — 99214 OFFICE O/P EST MOD 30 MIN: CPT | Performed by: FAMILY MEDICINE

## 2018-12-20 PROCEDURE — 1036F TOBACCO NON-USER: CPT | Performed by: FAMILY MEDICINE

## 2018-12-20 PROCEDURE — 3017F COLORECTAL CA SCREEN DOC REV: CPT | Performed by: FAMILY MEDICINE

## 2018-12-20 PROCEDURE — G8427 DOCREV CUR MEDS BY ELIG CLIN: HCPCS | Performed by: FAMILY MEDICINE

## 2018-12-20 PROCEDURE — G8484 FLU IMMUNIZE NO ADMIN: HCPCS | Performed by: FAMILY MEDICINE

## 2018-12-20 PROCEDURE — G8417 CALC BMI ABV UP PARAM F/U: HCPCS | Performed by: FAMILY MEDICINE

## 2018-12-20 PROCEDURE — 3044F HG A1C LEVEL LT 7.0%: CPT | Performed by: FAMILY MEDICINE

## 2018-12-20 RX ORDER — PHENTERMINE HYDROCHLORIDE 37.5 MG/1
37.5 TABLET ORAL
Qty: 30 TABLET | Refills: 0 | Status: SHIPPED | OUTPATIENT
Start: 2018-12-20 | End: 2019-01-22 | Stop reason: SDUPTHER

## 2018-12-20 RX ORDER — CLINDAMYCIN PHOSPHATE 11.9 MG/ML
SOLUTION TOPICAL
Qty: 30 ML | Refills: 3 | Status: SHIPPED | OUTPATIENT
Start: 2018-12-20 | End: 2019-01-19

## 2019-01-22 ENCOUNTER — NURSE ONLY (OUTPATIENT)
Dept: FAMILY MEDICINE CLINIC | Age: 64
End: 2019-01-22

## 2019-01-22 VITALS — WEIGHT: 315 LBS | BODY MASS INDEX: 53.16 KG/M2

## 2019-01-22 DIAGNOSIS — E66.01 OBESITY, MORBID, BMI 50 OR HIGHER (HCC): Primary | ICD-10-CM

## 2019-01-22 DIAGNOSIS — E66.01 OBESITY, MORBID, BMI 50 OR HIGHER (HCC): ICD-10-CM

## 2019-01-22 RX ORDER — PHENTERMINE HYDROCHLORIDE 37.5 MG/1
37.5 TABLET ORAL
Qty: 30 TABLET | Refills: 0 | Status: SHIPPED | OUTPATIENT
Start: 2019-01-22 | End: 2019-02-21 | Stop reason: SDUPTHER

## 2019-01-31 DIAGNOSIS — E11.9 TYPE 2 DIABETES MELLITUS WITHOUT COMPLICATION, WITHOUT LONG-TERM CURRENT USE OF INSULIN (HCC): ICD-10-CM

## 2019-02-21 ENCOUNTER — TELEPHONE (OUTPATIENT)
Dept: FAMILY MEDICINE CLINIC | Age: 64
End: 2019-02-21

## 2019-02-21 ENCOUNTER — NURSE ONLY (OUTPATIENT)
Dept: FAMILY MEDICINE CLINIC | Age: 64
End: 2019-02-21

## 2019-02-21 VITALS — WEIGHT: 315 LBS | BODY MASS INDEX: 53.63 KG/M2

## 2019-02-21 DIAGNOSIS — E66.01 OBESITY, MORBID, BMI 50 OR HIGHER (HCC): ICD-10-CM

## 2019-02-21 RX ORDER — PHENTERMINE HYDROCHLORIDE 37.5 MG/1
37.5 TABLET ORAL
Qty: 30 TABLET | Refills: 0 | Status: SHIPPED | OUTPATIENT
Start: 2019-02-21 | End: 2019-10-24 | Stop reason: SDUPTHER

## 2019-03-21 ENCOUNTER — OFFICE VISIT (OUTPATIENT)
Dept: FAMILY MEDICINE CLINIC | Age: 64
End: 2019-03-21
Payer: MEDICARE

## 2019-03-21 VITALS
TEMPERATURE: 97.8 F | RESPIRATION RATE: 16 BRPM | SYSTOLIC BLOOD PRESSURE: 122 MMHG | HEART RATE: 88 BPM | BODY MASS INDEX: 44.1 KG/M2 | DIASTOLIC BLOOD PRESSURE: 76 MMHG | WEIGHT: 315 LBS | HEIGHT: 71 IN

## 2019-03-21 DIAGNOSIS — E66.01 MORBID OBESITY WITH BMI OF 50.0-59.9, ADULT (HCC): ICD-10-CM

## 2019-03-21 DIAGNOSIS — I10 HTN, GOAL BELOW 140/90: ICD-10-CM

## 2019-03-21 DIAGNOSIS — R80.9 MICROALBUMINURIA DUE TO TYPE 2 DIABETES MELLITUS (HCC): ICD-10-CM

## 2019-03-21 DIAGNOSIS — J44.9 CHRONIC OBSTRUCTIVE PULMONARY DISEASE, UNSPECIFIED COPD TYPE (HCC): ICD-10-CM

## 2019-03-21 DIAGNOSIS — E11.29 MICROALBUMINURIA DUE TO TYPE 2 DIABETES MELLITUS (HCC): ICD-10-CM

## 2019-03-21 DIAGNOSIS — Z13.31 POSITIVE DEPRESSION SCREENING: ICD-10-CM

## 2019-03-21 DIAGNOSIS — E11.9 TYPE 2 DIABETES MELLITUS WITHOUT COMPLICATION, WITHOUT LONG-TERM CURRENT USE OF INSULIN (HCC): Primary | ICD-10-CM

## 2019-03-21 DIAGNOSIS — E66.01 OBESITY, MORBID, BMI 50 OR HIGHER (HCC): ICD-10-CM

## 2019-03-21 DIAGNOSIS — R60.0 LOWER EXTREMITY EDEMA: ICD-10-CM

## 2019-03-21 DIAGNOSIS — F51.01 PRIMARY INSOMNIA: ICD-10-CM

## 2019-03-21 LAB — HBA1C MFR BLD: 6.3 %

## 2019-03-21 PROCEDURE — G8926 SPIRO NO PERF OR DOC: HCPCS | Performed by: FAMILY MEDICINE

## 2019-03-21 PROCEDURE — G8417 CALC BMI ABV UP PARAM F/U: HCPCS | Performed by: FAMILY MEDICINE

## 2019-03-21 PROCEDURE — 83036 HEMOGLOBIN GLYCOSYLATED A1C: CPT | Performed by: FAMILY MEDICINE

## 2019-03-21 PROCEDURE — 99214 OFFICE O/P EST MOD 30 MIN: CPT | Performed by: FAMILY MEDICINE

## 2019-03-21 PROCEDURE — 1036F TOBACCO NON-USER: CPT | Performed by: FAMILY MEDICINE

## 2019-03-21 PROCEDURE — 3023F SPIROM DOC REV: CPT | Performed by: FAMILY MEDICINE

## 2019-03-21 PROCEDURE — G8484 FLU IMMUNIZE NO ADMIN: HCPCS | Performed by: FAMILY MEDICINE

## 2019-03-21 PROCEDURE — G8427 DOCREV CUR MEDS BY ELIG CLIN: HCPCS | Performed by: FAMILY MEDICINE

## 2019-03-21 PROCEDURE — G8431 POS CLIN DEPRES SCRN F/U DOC: HCPCS | Performed by: FAMILY MEDICINE

## 2019-03-21 PROCEDURE — 3044F HG A1C LEVEL LT 7.0%: CPT | Performed by: FAMILY MEDICINE

## 2019-03-21 PROCEDURE — 2022F DILAT RTA XM EVC RTNOPTHY: CPT | Performed by: FAMILY MEDICINE

## 2019-03-21 PROCEDURE — 3017F COLORECTAL CA SCREEN DOC REV: CPT | Performed by: FAMILY MEDICINE

## 2019-03-21 RX ORDER — ZOLPIDEM TARTRATE 5 MG/1
5 TABLET ORAL NIGHTLY PRN
Qty: 30 TABLET | Refills: 2 | Status: SHIPPED | OUTPATIENT
Start: 2019-03-21 | End: 2019-10-17 | Stop reason: SDUPTHER

## 2019-03-21 ASSESSMENT — PATIENT HEALTH QUESTIONNAIRE - PHQ9
SUM OF ALL RESPONSES TO PHQ9 QUESTIONS 1 & 2: 5
1. LITTLE INTEREST OR PLEASURE IN DOING THINGS: 3
2. FEELING DOWN, DEPRESSED OR HOPELESS: 2
3. TROUBLE FALLING OR STAYING ASLEEP: 3
5. POOR APPETITE OR OVEREATING: 0
SUM OF ALL RESPONSES TO PHQ QUESTIONS 1-9: 18
4. FEELING TIRED OR HAVING LITTLE ENERGY: 1
8. MOVING OR SPEAKING SO SLOWLY THAT OTHER PEOPLE COULD HAVE NOTICED. OR THE OPPOSITE, BEING SO FIGETY OR RESTLESS THAT YOU HAVE BEEN MOVING AROUND A LOT MORE THAN USUAL: 2
9. THOUGHTS THAT YOU WOULD BE BETTER OFF DEAD, OR OF HURTING YOURSELF: 1
7. TROUBLE CONCENTRATING ON THINGS, SUCH AS READING THE NEWSPAPER OR WATCHING TELEVISION: 3
10. IF YOU CHECKED OFF ANY PROBLEMS, HOW DIFFICULT HAVE THESE PROBLEMS MADE IT FOR YOU TO DO YOUR WORK, TAKE CARE OF THINGS AT HOME, OR GET ALONG WITH OTHER PEOPLE: 0
SUM OF ALL RESPONSES TO PHQ QUESTIONS 1-9: 18
6. FEELING BAD ABOUT YOURSELF - OR THAT YOU ARE A FAILURE OR HAVE LET YOURSELF OR YOUR FAMILY DOWN: 3

## 2019-04-15 ENCOUNTER — OFFICE VISIT (OUTPATIENT)
Dept: FAMILY MEDICINE CLINIC | Age: 64
End: 2019-04-15
Payer: MEDICARE

## 2019-04-15 VITALS
BODY MASS INDEX: 45.1 KG/M2 | SYSTOLIC BLOOD PRESSURE: 122 MMHG | HEIGHT: 70 IN | HEART RATE: 88 BPM | TEMPERATURE: 98.1 F | RESPIRATION RATE: 16 BRPM | DIASTOLIC BLOOD PRESSURE: 64 MMHG | WEIGHT: 315 LBS

## 2019-04-15 DIAGNOSIS — E66.01 MORBID OBESITY WITH BMI OF 50.0-59.9, ADULT (HCC): Primary | ICD-10-CM

## 2019-04-15 PROCEDURE — G8417 CALC BMI ABV UP PARAM F/U: HCPCS | Performed by: FAMILY MEDICINE

## 2019-04-15 PROCEDURE — 99213 OFFICE O/P EST LOW 20 MIN: CPT | Performed by: FAMILY MEDICINE

## 2019-04-15 PROCEDURE — G8427 DOCREV CUR MEDS BY ELIG CLIN: HCPCS | Performed by: FAMILY MEDICINE

## 2019-04-15 PROCEDURE — 1036F TOBACCO NON-USER: CPT | Performed by: FAMILY MEDICINE

## 2019-04-15 PROCEDURE — 3017F COLORECTAL CA SCREEN DOC REV: CPT | Performed by: FAMILY MEDICINE

## 2019-04-15 NOTE — PROGRESS NOTES
Have you changed or started any medications since your last visit including any over-the-counter medicines, vitamins, or herbal medicines? no   Are you having any side effects from any of your medications? -  no  Have you stopped taking any of your medications? Is so, why? -  no    Have you seen any other physician or provider since your last visit? Yes - Records Obtained  Have you had any other diagnostic tests since your last visit? No  Have you been seen in the emergency room and/or had an admission to a hospital since we last saw you? No  Have you had your routine dental cleaning in the past 6 months? no    Have you activated your [employer] account? If not, what are your barriers? No:      Patient Care Team:  Rama Elder,  as PCP - General (Family Medicine)  Rama Elder, DO as PCP - [facility] Attributed Provider    Medical History Review  Past Medical, Family, and Social History     Defer to provider.

## 2019-04-15 NOTE — PROGRESS NOTES
Laterality Date    COLONOSCOPY  4/17/15    OTHER SURGICAL HISTORY      Had face surgery at [address] [place] [address] Left     Dr. Francheska Shrestha History     Tobacco Use    Smoking status: Former Smoker     Packs/day: 0.50     Years: 40.00     Pack years: 20.00     Types: Cigarettes     Start date: 6/10/1974     Last attempt to quit: 3/20/2017     Years since quittin.0    Smokeless tobacco: Never Used    Tobacco comment: information given at past appts   Substance Use Topics    Alcohol use: Yes     Comment: very little    Drug use: No       Family History   Problem Relation Age of Onset    Arthritis Father     Stroke Father     Heart Attack Father          I have reviewed the patient's past medical history, past surgical history, allergies, medications, social and family history and I have made updates where appropriate.     Review of Systems        PHYSICAL EXAM:  /64   Pulse 88   Temp 98.1 °F (36.7 °C) (Oral)   Resp 16   Ht 5' 10\" (1.778 m)   Wt (!) 398 lb (180.5 kg)   BMI 57.11 kg/m²     General Appearance: well developed and well- nourished, in no acute distress  Head: normocephalic and atraumatic  ENT: external ear and ear canal normal bilaterally, nose without deformity, nasal mucosa and turbinates normal without polyps, oropharynx normal, dentition is normal for age, no lipor gum lesions noted  Neck: supple and non-tender without mass, no thyromegaly or thyroid nodules, no cervical lymphadenopathy  Pulmonary/Chest: clear to auscultation bilaterally- no wheezes, rales or rhonchi, normal air movement, no respiratory distress or retractions  Cardiovascular: normal rate, regular rhythm, normal S1 and S2, no murmurs, rubs, clicks, or gallops, distal pulses intact  Extremities: no cyanosis, clubbing or edema of the lower extremities  Psych:  Normal affect without evidence of depression oranxiety, insight and judgement are appropriate, memory appears intact  Skin: warm and dry, no rash or erythema      ASSESSMENT & PLAN  Ange Gonzalez was seen today for other. Diagnoses and all orders for this visit:    Morbid obesity with BMI of 50.0-59.9, adult [facility])  -     Carla Montesinos MD, Fabian Hughes, [address] to [employer] management referral  -Follow up with me as scheduled    Return if symptoms worsen or fail to improve. Controlled Substances Monitoring:                     Ange Gonzalez received counseling on the following healthy behaviors: medication adherence  Reviewed prior labs and health maintenance. Continue current medications, diet and exercise. Discussed use, benefit, and side effects of prescribed medications. Barriers to medication compliance addressed. Patient given educational materials - see patient instructions. All patient questions answered. Patient voiced understanding.

## 2019-05-07 ENCOUNTER — OFFICE VISIT (OUTPATIENT)
Dept: FAMILY MEDICINE CLINIC | Age: 64
End: 2019-05-07
Payer: MEDICARE

## 2019-05-07 VITALS
HEART RATE: 93 BPM | SYSTOLIC BLOOD PRESSURE: 134 MMHG | WEIGHT: 315 LBS | HEIGHT: 70 IN | TEMPERATURE: 98.4 F | DIASTOLIC BLOOD PRESSURE: 84 MMHG | BODY MASS INDEX: 45.1 KG/M2 | RESPIRATION RATE: 24 BRPM | OXYGEN SATURATION: 96 %

## 2019-05-07 DIAGNOSIS — Z00.00 ROUTINE GENERAL MEDICAL EXAMINATION AT A HEALTH CARE FACILITY: Primary | ICD-10-CM

## 2019-05-07 PROCEDURE — 3017F COLORECTAL CA SCREEN DOC REV: CPT | Performed by: FAMILY MEDICINE

## 2019-05-07 PROCEDURE — G0438 PPPS, INITIAL VISIT: HCPCS | Performed by: FAMILY MEDICINE

## 2019-05-07 ASSESSMENT — ANXIETY QUESTIONNAIRES: GAD7 TOTAL SCORE: 0

## 2019-05-07 ASSESSMENT — PATIENT HEALTH QUESTIONNAIRE - PHQ9
SUM OF ALL RESPONSES TO PHQ QUESTIONS 1-9: 0
SUM OF ALL RESPONSES TO PHQ QUESTIONS 1-9: 0

## 2019-05-07 NOTE — PATIENT INSTRUCTIONS
traveling in a car. Always wear a helmet when riding a bicycle or motorcycle. Personalized Preventive Plan for Lazara Page - 5/7/2019  Medicare offers a range of preventive health benefits. Some of the tests and screenings are paid in full while other may be subject to a deductible, co-insurance, and/or copay. Some of these benefits include a comprehensive review of your medical history including lifestyle, illnesses that may run in your family, and various assessments and screenings as appropriate. After reviewing your medical record and screening and assessments performed today your provider may have ordered immunizations, labs, imaging, and/or referrals for you. A list of these orders (if applicable) as well as your Preventive Care list are included within your After Visit Summary for your review. Other Preventive Recommendations:    A preventive eye exam performed by an eye specialist is recommended every 1-2 years to screen for glaucoma; cataracts, macular degeneration, and other eye disorders. A preventive dental visit is recommended every 6 months. Try to get at least 150 minutes of exercise per week or 10,000 steps per day on a pedometer . Order or download the FREE \"Exercise & Physical Activity: Your Everyday Guide\" from [employer]. Call 0-282.341.8840 or search [employer] online. You need 3516-5098 mg of calcium and 7667-2356 IU of vitamin D per day. It is possible to meet your calcium requirement with diet alone, but a vitamin D supplement is usually necessary to meet this goal.  When exposed to the sun, use a sunscreen that protects against both UVA and UVB radiation with an SPF of 30 or greater. Reapply every 2 to 3 hours or after sweating, drying off with a towel, or swimming. Always wear a seat belt when traveling in a car. Always wear a helmet when riding a bicycle or motorcycle.

## 2019-05-07 NOTE — PROGRESS NOTES
(AMBIEN) 5 MG tablet Take 1 tablet by mouth nightly as needed for Sleep for up to 90 days. Yes Maty Siu, DO   metFORMIN (GLUCOPHAGE) 1000 MG tablet 1 tab PO daily Yes Jamin Fragoso, DO   lisinopril (PRINIVIL;ZESTRIL) 10 MG tablet Take 1 tablet by mouth daily Yes Carl Rao, DO   bumetanide (BUMEX) 1 MG tablet Take 2 tabs PO q AM and take 1 tabs PO qhs Yes Jamin Fragoso, DO   albuterol sulfate HFA (VENTOLIN HFA) 108 (90 Base) MCG/ACT inhaler Inhale 2 puffs into the lungs every 6 hours as needed for Wheezing Yes Zelphia Hotter, APRN - CNP   pregabalin (LYRICA) 50 MG capsule Take 50 mg by mouth 2 times daily. . Yes Historical Provider, MD   baclofen (LIORESAL) 10 MG tablet Take 10 mg by mouth every evening Yes Historical Provider, MD   aspirin 81 MG tablet Take 81 mg by mouth daily Yes Historical Provider, MD   Tens [address] [facility] by Does not apply route Yes Historical Provider, MD   Handicap Placard MISC by Does not apply route Expires 12/20/2022 Yes Maty Siu,    DULoxetine (CYMBALTA) 60 MG capsule Take 60 mg by mouth daily. Yes Historical Provider, MD   Naproxen Sodium (ALEVE) 220 MG CAPS Take 2 tablets by mouth every 6 hours as needed for Pain.  Yes Historical Provider, MD      Diagnosis Date    Asthma     CHF (congestive heart failure) (HCC)     Chronic back pain     Chronic neck pain     Obesity      Past Surgical History:   Procedure Laterality Date    COLONOSCOPY  4/17/15    OTHER SURGICAL HISTORY      Had face surgery at [address] [place] [address] Left 2007    Dr. Anderson Bryan       Family History   Problem Relation Age of Onset    Arthritis Father     Stroke Father     Heart Attack Father        CareTeam (Including outside providers/suppliers regularly involved in providing care):   Patient Care Team:  Maty Siu DO as PCP - General (Family Medicine)  Maty Siu DO as PCP - [facility] Attributed Provider    Wt Readings from Last 3 Encounters:   05/07/19 (!) 393 lb (178.3 kg)   04/15/19 (!) 398 lb (180.5 kg)   03/21/19 (!) 396 lb (179.6 kg)     Vitals:    05/07/19 1439 05/07/19 1456   BP: (!) 156/108 134/84   Pulse: 93    Resp: 24    Temp: 98.4 °F (36.9 °C)    SpO2: 96%    Weight: (!) 393 lb (178.3 kg)    Height: 5' 10\" (1.778 m)      Body mass index is 56.39 kg/m². Based upon direct observation of the patient, evaluation of cognition reveals recent and remote memory intact. Patient's complete Health Risk Assessment and screening values have been reviewed and are found in Flowsheets. The following problems were reviewed today and where indicated follow up appointments were made and/or referrals ordered. Positive Risk Factor Screenings with Interventions:     Health Habits/Nutrition:     Body mass index is 56.39 kg/m². Health Habits/Nutrition Interventions:  · Has been referred to weight management center at last visit    Personalized Preventive Plan   Current Health Maintenance Status  Immunization History   Administered Date(s) Administered    Influenza Virus Vaccine 11/11/2017, 10/10/2018    Influenza, [place], 3 Years and older, IM (Fluzone 3 yrs and older or Afluria 5 yrs and older) 11/04/2016    Pneumococcal Polysaccharide ([id number]) 02/24/2016, 10/10/2018        Health Maintenance   Topic Date Due    Hepatitis C screen  1955    Diabetic foot exam  05/02/1967    Diabetic retinal exam  05/02/1967    Lipid screen  05/02/1967    HIV screen  05/02/1972    DTaP/Tdap/Td vaccine (1 - Tdap) 05/02/1976    Shingles Vaccine (1 of 2) 05/02/2007    Potassium monitoring  06/25/2019    Creatinine monitoring  06/25/2019    A1C test (Diabetic or Prediabetic)  03/21/2020    Colon cancer screen colonoscopy  04/17/2020    Flu vaccine  Completed    Pneumococcal 0-64 years Vaccine  Completed     Recommendations for Preventive Services Due: see orders and patient instructions/AVS.  .   Recommended screening schedule for the next 5-10 years is provided to the patient in written form: see Patient Instructions/AVS.

## 2019-05-07 NOTE — PROGRESS NOTES
Visit Information    Have you changed or started any medications since your last visit including any over-the-counter medicines, vitamins, or herbal medicines? no   Are you having any side effects from any of your medications? -  no  Have you stopped taking any of your medications? Is so, why? -  no    Have you seen any other physician or provider since your last visit? Yes - Records Obtained  Have you had any other diagnostic tests since your last visit? No  Have you been seen in the emergency room and/or had an admission to a hospital since we last saw you? No  Have you had your routine dental cleaning in the past 6 months? no    Have you activated your [employer] account? If not, what are your barriers?  No: pt declined     Patient Care Team:  Manny Dyson,  as PCP - General (Family Medicine)  Manny Dyson,  as PCP - S Attributed Provider    Medical History Review  Past Medical, Family, and Social History reviewed and does contribute to the patient presenting condition    Health Maintenance   Topic Date Due    Hepatitis C screen  1955    Diabetic foot exam  05/02/1967    Diabetic retinal exam  05/02/1967    Lipid screen  05/02/1967    HIV screen  05/02/1972    DTaP/Tdap/Td vaccine (1 - Tdap) 05/02/1976    Shingles Vaccine (1 of 2) 05/02/2007    Potassium monitoring  06/25/2019    Creatinine monitoring  06/25/2019    A1C test (Diabetic or Prediabetic)  03/21/2020    Colon cancer screen colonoscopy  04/17/2020    Flu vaccine  Completed    Pneumococcal 0-64 years Vaccine  Completed

## 2019-05-23 ENCOUNTER — TELEPHONE (OUTPATIENT)
Dept: FAMILY MEDICINE CLINIC | Age: 64
End: 2019-05-23

## 2019-05-23 NOTE — TELEPHONE ENCOUNTER
Marly Escalante, called and stated that patient was seen at [facility] in [place] on 5/21/19. He was seen because he couldn't breathe. They said he has fluid in his tissues. Was put on antibiotics. He's doing breathing treatments at home. Sometimes he's better, sometimes he's not. Oxygen level drops when he stands up. Please advise if any instructions. She is always available by phone.

## 2019-05-24 NOTE — TELEPHONE ENCOUNTER
Offered [phone] slot for today but pt refused and said There isn't anything Dr. Antonio Wang can do for me because I am on an anti-biotic. I told him that we need to evaluate him and determine possible further Tx. He still declined the marjorie today and is asking for an marjorie Tuesday. Only same day slots are open and not sure if pt should wait that long.  Please advise

## 2019-05-28 ENCOUNTER — OFFICE VISIT (OUTPATIENT)
Dept: FAMILY MEDICINE CLINIC | Age: 64
End: 2019-05-28
Payer: MEDICARE

## 2019-05-28 VITALS
SYSTOLIC BLOOD PRESSURE: 142 MMHG | WEIGHT: 315 LBS | BODY MASS INDEX: 45.1 KG/M2 | DIASTOLIC BLOOD PRESSURE: 86 MMHG | OXYGEN SATURATION: 94 % | HEART RATE: 84 BPM | RESPIRATION RATE: 20 BRPM | TEMPERATURE: 98.3 F | HEIGHT: 70 IN

## 2019-05-28 DIAGNOSIS — J44.1 COPD WITH ACUTE EXACERBATION (HCC): Primary | ICD-10-CM

## 2019-05-28 DIAGNOSIS — J44.9 CHRONIC OBSTRUCTIVE PULMONARY DISEASE, UNSPECIFIED COPD TYPE (HCC): ICD-10-CM

## 2019-05-28 PROCEDURE — G8926 SPIRO NO PERF OR DOC: HCPCS | Performed by: FAMILY MEDICINE

## 2019-05-28 PROCEDURE — G8417 CALC BMI ABV UP PARAM F/U: HCPCS | Performed by: FAMILY MEDICINE

## 2019-05-28 PROCEDURE — 3017F COLORECTAL CA SCREEN DOC REV: CPT | Performed by: FAMILY MEDICINE

## 2019-05-28 PROCEDURE — 1036F TOBACCO NON-USER: CPT | Performed by: FAMILY MEDICINE

## 2019-05-28 PROCEDURE — G8427 DOCREV CUR MEDS BY ELIG CLIN: HCPCS | Performed by: FAMILY MEDICINE

## 2019-05-28 PROCEDURE — 3023F SPIROM DOC REV: CPT | Performed by: FAMILY MEDICINE

## 2019-05-28 PROCEDURE — 99213 OFFICE O/P EST LOW 20 MIN: CPT | Performed by: FAMILY MEDICINE

## 2019-05-28 RX ORDER — PREDNISONE 20 MG/1
40 TABLET ORAL DAILY
Qty: 14 TABLET | Refills: 0 | Status: SHIPPED | OUTPATIENT
Start: 2019-05-28 | End: 2019-06-04

## 2019-05-28 RX ORDER — ALBUTEROL SULFATE 90 UG/1
2 AEROSOL, METERED RESPIRATORY (INHALATION) EVERY 6 HOURS PRN
Qty: 3 INHALER | Refills: 3 | Status: SHIPPED | OUTPATIENT
Start: 2019-05-28 | End: 2021-12-30 | Stop reason: SDUPTHER

## 2019-05-28 NOTE — PROGRESS NOTES
Pravin Amador is a 60 y.o. male that presents for     Chief Complaint   Patient presents with    Breathing Problem     Pt presents c/o continued breathing problems. He states he is doing better and able to breath better but still is difficult to breath. BP (!) 142/86   Pulse 84   Temp 98.3 °F (36.8 °C)   Resp 20   Ht 5' 10\" (1.778 m)   Wt (!) 402 lb 3.2 oz (182.4 kg)   SpO2 94%   BMI 57.71 kg/m²       HPI:    ER Follow Up:  Patient presents for ER follow up. Patient recently seen in ED at [facility] for evaluation and treatment of COPD exacerbation. Symptoms prior to presenting to ER:  SOB, CP    ER Course per ER provider note:  BNP wnl. Was given medrol dose pack, azithromycin. Clinical course since discharge:  States that overall he is improved. He is on albuterol alone for the his COPD. He is also using an albuterol nebulizer that was given in the ER. He still has a productive cough. Notes exertional dyspnea is improved, but still notes he has some difficulty getting his breath.         Health Maintenance   Topic Date Due    Hepatitis C screen  1955    Diabetic foot exam  05/02/1967    Diabetic retinal exam  05/02/1967    Lipid screen  05/02/1967    HIV screen  05/02/1972    DTaP/Tdap/Td vaccine (1 - Tdap) 05/02/1976    Shingles Vaccine (1 of 2) 05/02/2007    Potassium monitoring  06/25/2019    Creatinine monitoring  06/25/2019    A1C test (Diabetic or Prediabetic)  03/21/2020    Colon cancer screen colonoscopy  04/17/2020    Flu vaccine  Completed    Pneumococcal 0-64 years Vaccine  Completed       Past Medical History:   Diagnosis Date    Asthma     CHF (congestive heart failure) (HCC)     Chronic back pain     Chronic neck pain     Obesity        Past Surgical History:   Procedure Laterality Date    COLONOSCOPY  4/17/15    OTHER SURGICAL HISTORY      Had face surgery at [address] [place] [address] Left 2007    Dr. Villarreal Mediate       Social History today for breathing problem. Diagnoses and all orders for this visit:    COPD with acute exacerbation ([facility] [address]  -     predniSONE (DELTASONE) 20 MG tablet; Take 2 tablets by mouth daily for 7 days  -     tiotropium (SPIRIVA RESPIMAT) 2.5 MCG/ACT AERS inhaler; Inhale 2 puffs into the lungs daily    -Will do a sustained dose of prednisone x 7 days  -Give samples of spiriva today, will call in 3 weeks to see if this is helping with his breathing overall    Return if symptoms worsen or fail to improve. Controlled Substances Monitoring:                     Indigo Rice received counseling on the following healthy behaviors: medication adherence  Reviewed prior labs and health maintenance. Continue current medications, diet and exercise. Discussed use, benefit, and side effects of prescribed medications. Barriers to medication compliance addressed. Patient given educational materials - see patient instructions. All patient questions answered. Patient voiced understanding.

## 2019-05-28 NOTE — PROGRESS NOTES
Visit Information    Have you changed or started any medications since your last visit including any over-the-counter medicines, vitamins, or herbal medicines? no   Are you having any side effects from any of your medications? -  no  Have you stopped taking any of your medications? Is so, why? -  no    Have you seen any other physician or provider since your last visit? Yes - Records Obtained  Have you had any other diagnostic tests since your last visit? Yes - Records Obtained  Have you been seen in the emergency room and/or had an admission to a hospital since we last saw you? Yes - Records Obtained  Have you had your routine dental cleaning in the past 6 months? no    Have you activated your [employer] account? If not, what are your barriers?  No: pt declined     Patient Care Team:  Marcella Teran DO as PCP - General (Family Medicine)  Marcella Teran DO as PCP - S Attributed Provider    Medical History Review  Past Medical, Family, and Social History reviewed and does contribute to the patient presenting condition    Health Maintenance   Topic Date Due    Hepatitis C screen  1955    Diabetic foot exam  05/02/1967    Diabetic retinal exam  05/02/1967    Lipid screen  05/02/1967    HIV screen  05/02/1972    DTaP/Tdap/Td vaccine (1 - Tdap) 05/02/1976    Shingles Vaccine (1 of 2) 05/02/2007    Potassium monitoring  06/25/2019    Creatinine monitoring  06/25/2019    A1C test (Diabetic or Prediabetic)  03/21/2020    Colon cancer screen colonoscopy  04/17/2020    Flu vaccine  Completed    Pneumococcal 0-64 years Vaccine  Completed

## 2019-05-31 DIAGNOSIS — R60.9 DEPENDENT EDEMA: ICD-10-CM

## 2019-05-31 RX ORDER — BUMETANIDE 1 MG/1
TABLET ORAL
Qty: 90 TABLET | Refills: 5 | Status: SHIPPED | OUTPATIENT
Start: 2019-05-31 | End: 2022-10-21 | Stop reason: SDUPTHER

## 2019-06-18 ENCOUNTER — TELEPHONE (OUTPATIENT)
Dept: FAMILY MEDICINE CLINIC | Age: 64
End: 2019-06-18

## 2019-06-19 ENCOUNTER — TELEPHONE (OUTPATIENT)
Dept: PHARMACY | Age: 64
End: 2019-06-19

## 2019-06-19 NOTE — TELEPHONE ENCOUNTER
Spoke with Sarah about getting Catrachita Perez some assistance on his Spiriva, I helped them last year with his Ventolin. She ok'd for me to start the application and see what I can do, I have all of his information from last year so hopefully it gets approved in a timely manner.          Monique Westbrook Medication Coordinator   [facility]   (U) [phone]  (W) [phone]

## 2019-07-24 ENCOUNTER — OFFICE VISIT (OUTPATIENT)
Dept: FAMILY MEDICINE CLINIC | Age: 64
End: 2019-07-24
Payer: MEDICARE

## 2019-07-24 VITALS
HEIGHT: 70 IN | SYSTOLIC BLOOD PRESSURE: 132 MMHG | DIASTOLIC BLOOD PRESSURE: 78 MMHG | HEART RATE: 70 BPM | BODY MASS INDEX: 45.1 KG/M2 | RESPIRATION RATE: 12 BRPM | OXYGEN SATURATION: 96 % | WEIGHT: 315 LBS | TEMPERATURE: 97.7 F

## 2019-07-24 DIAGNOSIS — E66.01 OBESITY, MORBID, BMI 50 OR HIGHER (HCC): ICD-10-CM

## 2019-07-24 DIAGNOSIS — E11.9 TYPE 2 DIABETES MELLITUS WITHOUT COMPLICATION, WITHOUT LONG-TERM CURRENT USE OF INSULIN (HCC): Primary | ICD-10-CM

## 2019-07-24 DIAGNOSIS — R60.9 DEPENDENT EDEMA: ICD-10-CM

## 2019-07-24 DIAGNOSIS — J44.1 COPD WITH ACUTE EXACERBATION (HCC): ICD-10-CM

## 2019-07-24 LAB — HBA1C MFR BLD: 9.1 %

## 2019-07-24 PROCEDURE — 3046F HEMOGLOBIN A1C LEVEL >9.0%: CPT | Performed by: FAMILY MEDICINE

## 2019-07-24 PROCEDURE — 2022F DILAT RTA XM EVC RTNOPTHY: CPT | Performed by: FAMILY MEDICINE

## 2019-07-24 PROCEDURE — 3017F COLORECTAL CA SCREEN DOC REV: CPT | Performed by: FAMILY MEDICINE

## 2019-07-24 PROCEDURE — G8417 CALC BMI ABV UP PARAM F/U: HCPCS | Performed by: FAMILY MEDICINE

## 2019-07-24 PROCEDURE — G8428 CUR MEDS NOT DOCUMENT: HCPCS | Performed by: FAMILY MEDICINE

## 2019-07-24 PROCEDURE — 3023F SPIROM DOC REV: CPT | Performed by: FAMILY MEDICINE

## 2019-07-24 PROCEDURE — G8926 SPIRO NO PERF OR DOC: HCPCS | Performed by: FAMILY MEDICINE

## 2019-07-24 PROCEDURE — 99214 OFFICE O/P EST MOD 30 MIN: CPT | Performed by: FAMILY MEDICINE

## 2019-07-24 PROCEDURE — 1036F TOBACCO NON-USER: CPT | Performed by: FAMILY MEDICINE

## 2019-07-24 PROCEDURE — 83036 HEMOGLOBIN GLYCOSYLATED A1C: CPT | Performed by: FAMILY MEDICINE

## 2019-07-24 RX ORDER — AZITHROMYCIN 250 MG/1
TABLET, FILM COATED ORAL
Qty: 1 PACKET | Refills: 0 | Status: SHIPPED | OUTPATIENT
Start: 2019-07-24 | End: 2019-10-24

## 2019-07-24 RX ORDER — PREDNISONE 20 MG/1
40 TABLET ORAL DAILY
Qty: 10 TABLET | Refills: 0 | Status: SHIPPED | OUTPATIENT
Start: 2019-07-24 | End: 2019-07-29

## 2019-07-24 NOTE — PROGRESS NOTES
of breath or chest pain? No  Headache or visual complaints? No  Neurologic changes like confusion? No  Extremity edema? Yes - chronic issue, notes that this is a little worse recently, he is on a relatively higher dose of bumex, he started wearing his compression stockings again yesterday.     BP Readings from Last 3 Encounters:   19 132/78   19 (!) 142/86   19 134/84       Health Maintenance   Topic Date Due    Hepatitis C screen  1957    Diabetic foot exam  1967    Diabetic retinal exam  1967    Lipid screen  1967    HIV screen  1972    DTaP/Tdap/Td vaccine (1 - Tdap) 1976    Shingles Vaccine (1 of 2) 2007    A1C test (Diabetic or Prediabetic)  2019    Potassium monitoring  2019    Creatinine monitoring  2019    Flu vaccine (1) 2019    Colon cancer screen colonoscopy  2020    Pneumococcal 0-64 years Vaccine  Completed       Past Medical History:   Diagnosis Date    Asthma     CHF (congestive heart failure) (HCC)     Chronic back pain     Chronic neck pain     Obesity        Past Surgical History:   Procedure Laterality Date    COLONOSCOPY  4/17/15    OTHER SURGICAL HISTORY      Had face surgery at [address] [place] [address]     Dr. Angela Garcia       Social History     Tobacco Use    Smoking status: Former Smoker     Packs/day: 0.50     Years: 40.00     Pack years: 20.00     Types: Cigarettes     Start date: 6/10/1974     Last attempt to quit: 3/20/2017     Years since quittin.3    Smokeless tobacco: Never Used    Tobacco comment: information given at past appts   Substance Use Topics    Alcohol use: Yes     Comment: very little    Drug use: No       Family History   Problem Relation Age of Onset    Arthritis Father     Stroke Father     Heart Attack Father          I have reviewed the patient's past medical history, past surgical history, allergies, medications, social and family history and

## 2019-07-24 NOTE — PATIENT INSTRUCTIONS
quickly these foods raise blood sugar. Carbohydrate raises blood sugar more quickly than other nutrients, like proteins and fats. Some carbohydrate foods raise blood sugar faster than others. · Low glycemic foods release sugar into the blood slowly. · High glycemic foods make blood sugar rise quickly. How does it work? Foods in the index are ranked by number. · High glycemic index foods are rated 70 and above. · Medium glycemic index foods are 56 to 69. · Low glycemic index foods are 55 or less. What do you need to know? Some people who have diabetes use the glycemic index to help them plan meals and manage blood sugar. · If you have diabetes, talk with your doctor, a dietitian, or a certified diabetes educator before using a low glycemic index eating plan. · Eating low glycemic foods is most helpful when used along with another eating plan for diabetes, such as carbohydrate counting. Counting carbs helps you know how much carbohydrate you're eating. The amount of carbohydrate you eat is more important than the glycemic index of foods in helping you control your blood sugar. · The rating of a food can change depending on ripeness, how it is prepared (juiced, mashed, ground), how it is cooked, and how long it is stored. · People respond differently to the glycemic content of foods. Many things affect the glycemic index. The only way to know for sure how a food affects your blood sugar is to check your blood sugar before and after you eat that food. · High GI foods are rarely eaten on their own. This means that the glycemic index might not be helpful unless you're eating a food by itself. Eating foods together can change their rating. What are examples of foods in the glycemic index? · High glycemic index foods include:  ? Watermelon. ? Baked potatoes, such as a russet. ? Pumpkin. ? Instant oatmeal.  ? White bread. · Medium glycemic index foods include:  ? Hamburger buns (white).   ? IntelliFlo rice.  · Low glycemic index foods include:  ? Apples. ? Sweet potatoes. ? Whole-grain bread. ? Whole wheat spaghetti. ? Dried beans and lentils. Where can you learn more? Go to [email or web address]://[email or web address].Segment. org and sign in to your [employer] account. Enter W948 in the [facility] box to learn more about \"Learning About the Glycemic Index. \"     If you do not have an account, please click on the \"Sign Up Now\" link. Current as of: July 25, 2018  Content Version: 12.0  © 2793-3110 [employer]wise, Incorporated. Care instructions adapted under license by [facility] ([facility]). If you have questions about a medical condition or this instruction, always ask your healthcare professional. [address] any warranty or liability for your use of this information.

## 2019-10-04 ENCOUNTER — TELEPHONE (OUTPATIENT)
Dept: FAMILY MEDICINE CLINIC | Age: 64
End: 2019-10-04

## 2019-10-04 LAB
ALBUMIN SERPL-MCNC: 3.6 G/DL (ref 3.2–5.3)
ALK PHOSPHATASE: 129 U/L (ref 39–130)
ALT SERPL-CCNC: 26 U/L (ref 0–40)
ANION GAP SERPL CALCULATED.3IONS-SCNC: 10 MMOL/L (ref 4–12)
AST SERPL-CCNC: 17 U/L (ref 0–41)
BILIRUB SERPL-MCNC: 0.5 MG/DL (ref 0.3–1.2)
BUN BLDV-MCNC: 16 MG/DL (ref 5–27)
CALCIUM SERPL-MCNC: 9 MG/DL (ref 8.5–10.5)
CHLORIDE BLD-SCNC: 104 MMOL/L (ref 98–109)
CHOLESTEROL/HDL RATIO: 3.8 (ref 1–5)
CHOLESTEROL: 190 MG/DL (ref 150–200)
CO2: 25 MMOL/L (ref 22–32)
CREAT SERPL-MCNC: 0.73 MG/DL (ref 0.6–1.3)
EGFR AFRICAN AMERICAN: >60 ML/MIN/1.73SQ.M
EGFR IF NONAFRICAN AMERICAN: >60 ML/MIN/1.73SQ.M
GLUCOSE: 287 MG/DL (ref 65–99)
HDLC SERPL-MCNC: 50 MG/DL
LDL CHOLESTEROL CALCULATED: 111 MG/DL
POTASSIUM SERPL-SCNC: 4.6 MMOL/L (ref 3.5–5)
SODIUM BLD-SCNC: 139 MMOL/L (ref 134–146)
TOTAL PROTEIN: 6 G/DL (ref 6–8)
TRIGL SERPL-MCNC: 146 MG/DL (ref 27–150)
VLDLC SERPL CALC-MCNC: 29 MG/DL (ref 0–30)

## 2019-10-17 DIAGNOSIS — F51.01 PRIMARY INSOMNIA: ICD-10-CM

## 2019-10-17 RX ORDER — ZOLPIDEM TARTRATE 5 MG/1
TABLET ORAL
Qty: 30 TABLET | Refills: 2 | Status: SHIPPED | OUTPATIENT
Start: 2019-10-17 | End: 2020-01-15

## 2019-10-24 ENCOUNTER — OFFICE VISIT (OUTPATIENT)
Dept: FAMILY MEDICINE CLINIC | Age: 64
End: 2019-10-24
Payer: MEDICARE

## 2019-10-24 VITALS
BODY MASS INDEX: 45.1 KG/M2 | DIASTOLIC BLOOD PRESSURE: 86 MMHG | HEIGHT: 70 IN | TEMPERATURE: 98.1 F | WEIGHT: 315 LBS | SYSTOLIC BLOOD PRESSURE: 146 MMHG | RESPIRATION RATE: 20 BRPM | OXYGEN SATURATION: 93 % | HEART RATE: 87 BPM

## 2019-10-24 DIAGNOSIS — E66.01 OBESITY, MORBID, BMI 50 OR HIGHER (HCC): ICD-10-CM

## 2019-10-24 DIAGNOSIS — F51.01 PRIMARY INSOMNIA: ICD-10-CM

## 2019-10-24 DIAGNOSIS — J44.9 CHRONIC OBSTRUCTIVE PULMONARY DISEASE, UNSPECIFIED COPD TYPE (HCC): ICD-10-CM

## 2019-10-24 DIAGNOSIS — L73.2 HIDRADENITIS SUPPURATIVA: ICD-10-CM

## 2019-10-24 DIAGNOSIS — E11.9 TYPE 2 DIABETES MELLITUS WITHOUT COMPLICATION, WITHOUT LONG-TERM CURRENT USE OF INSULIN (HCC): Primary | ICD-10-CM

## 2019-10-24 LAB — HBA1C MFR BLD: 12.7 %

## 2019-10-24 PROCEDURE — 83036 HEMOGLOBIN GLYCOSYLATED A1C: CPT | Performed by: FAMILY MEDICINE

## 2019-10-24 PROCEDURE — 3017F COLORECTAL CA SCREEN DOC REV: CPT | Performed by: FAMILY MEDICINE

## 2019-10-24 PROCEDURE — 3023F SPIROM DOC REV: CPT | Performed by: FAMILY MEDICINE

## 2019-10-24 PROCEDURE — 99214 OFFICE O/P EST MOD 30 MIN: CPT | Performed by: FAMILY MEDICINE

## 2019-10-24 PROCEDURE — G8427 DOCREV CUR MEDS BY ELIG CLIN: HCPCS | Performed by: FAMILY MEDICINE

## 2019-10-24 PROCEDURE — 1036F TOBACCO NON-USER: CPT | Performed by: FAMILY MEDICINE

## 2019-10-24 PROCEDURE — G8417 CALC BMI ABV UP PARAM F/U: HCPCS | Performed by: FAMILY MEDICINE

## 2019-10-24 PROCEDURE — G8926 SPIRO NO PERF OR DOC: HCPCS | Performed by: FAMILY MEDICINE

## 2019-10-24 PROCEDURE — 3046F HEMOGLOBIN A1C LEVEL >9.0%: CPT | Performed by: FAMILY MEDICINE

## 2019-10-24 PROCEDURE — G8484 FLU IMMUNIZE NO ADMIN: HCPCS | Performed by: FAMILY MEDICINE

## 2019-10-24 PROCEDURE — 2022F DILAT RTA XM EVC RTNOPTHY: CPT | Performed by: FAMILY MEDICINE

## 2019-10-24 RX ORDER — PHENTERMINE HYDROCHLORIDE 37.5 MG/1
37.5 TABLET ORAL
Qty: 30 TABLET | Refills: 0 | Status: SHIPPED | OUTPATIENT
Start: 2019-10-24 | End: 2019-11-26 | Stop reason: SDUPTHER

## 2019-10-24 RX ORDER — SULFAMETHOXAZOLE AND TRIMETHOPRIM 800; 160 MG/1; MG/1
1 TABLET ORAL 2 TIMES DAILY
Qty: 56 TABLET | Refills: 0 | Status: SHIPPED | OUTPATIENT
Start: 2019-10-24 | End: 2019-11-21

## 2019-10-24 RX ORDER — GLIPIZIDE 10 MG/1
10 TABLET ORAL 2 TIMES DAILY
Qty: 60 TABLET | Refills: 5 | Status: SHIPPED | OUTPATIENT
Start: 2019-10-24 | End: 2020-04-13

## 2019-11-14 ENCOUNTER — TELEPHONE (OUTPATIENT)
Dept: FAMILY MEDICINE CLINIC | Age: 64
End: 2019-11-14

## 2019-11-14 DIAGNOSIS — J44.9 CHRONIC OBSTRUCTIVE PULMONARY DISEASE, UNSPECIFIED COPD TYPE (HCC): Primary | ICD-10-CM

## 2019-11-26 ENCOUNTER — TELEPHONE (OUTPATIENT)
Dept: FAMILY MEDICINE CLINIC | Age: 64
End: 2019-11-26

## 2019-11-26 ENCOUNTER — NURSE ONLY (OUTPATIENT)
Dept: FAMILY MEDICINE CLINIC | Age: 64
End: 2019-11-26

## 2019-11-26 VITALS — HEIGHT: 71 IN | BODY MASS INDEX: 44.1 KG/M2 | WEIGHT: 315 LBS

## 2019-11-26 DIAGNOSIS — E66.01 OBESITY, MORBID, BMI 50 OR HIGHER (HCC): ICD-10-CM

## 2019-11-26 DIAGNOSIS — E66.01 OBESITY, MORBID, BMI 50 OR HIGHER (HCC): Primary | ICD-10-CM

## 2019-11-26 RX ORDER — PHENTERMINE HYDROCHLORIDE 37.5 MG/1
37.5 TABLET ORAL
Qty: 30 TABLET | Refills: 0 | Status: SHIPPED | OUTPATIENT
Start: 2019-11-26 | End: 2019-12-26

## 2020-01-24 ENCOUNTER — OFFICE VISIT (OUTPATIENT)
Dept: FAMILY MEDICINE CLINIC | Age: 65
End: 2020-01-24
Payer: MEDICARE

## 2020-01-24 ENCOUNTER — TELEPHONE (OUTPATIENT)
Dept: FAMILY MEDICINE CLINIC | Age: 65
End: 2020-01-24

## 2020-01-24 VITALS
TEMPERATURE: 98.4 F | DIASTOLIC BLOOD PRESSURE: 74 MMHG | WEIGHT: 315 LBS | HEIGHT: 70 IN | RESPIRATION RATE: 16 BRPM | SYSTOLIC BLOOD PRESSURE: 146 MMHG | OXYGEN SATURATION: 95 % | BODY MASS INDEX: 45.1 KG/M2 | HEART RATE: 88 BPM

## 2020-01-24 LAB — HBA1C MFR BLD: 9.5 %

## 2020-01-24 PROCEDURE — G0444 DEPRESSION SCREEN ANNUAL: HCPCS | Performed by: FAMILY MEDICINE

## 2020-01-24 PROCEDURE — 83036 HEMOGLOBIN GLYCOSYLATED A1C: CPT | Performed by: FAMILY MEDICINE

## 2020-01-24 PROCEDURE — 99214 OFFICE O/P EST MOD 30 MIN: CPT | Performed by: FAMILY MEDICINE

## 2020-01-24 RX ORDER — IPRATROPIUM BROMIDE AND ALBUTEROL SULFATE 2.5; .5 MG/3ML; MG/3ML
1 SOLUTION RESPIRATORY (INHALATION) EVERY 4 HOURS PRN
Qty: 360 ML | Refills: 2 | Status: SHIPPED | OUTPATIENT
Start: 2020-01-24

## 2020-01-24 ASSESSMENT — PATIENT HEALTH QUESTIONNAIRE - PHQ9
7. TROUBLE CONCENTRATING ON THINGS, SUCH AS READING THE NEWSPAPER OR WATCHING TELEVISION: 3
4. FEELING TIRED OR HAVING LITTLE ENERGY: 3
3. TROUBLE FALLING OR STAYING ASLEEP: 3
1. LITTLE INTEREST OR PLEASURE IN DOING THINGS: 3
SUM OF ALL RESPONSES TO PHQ9 QUESTIONS 1 & 2: 6
5. POOR APPETITE OR OVEREATING: 3
8. MOVING OR SPEAKING SO SLOWLY THAT OTHER PEOPLE COULD HAVE NOTICED. OR THE OPPOSITE, BEING SO FIGETY OR RESTLESS THAT YOU HAVE BEEN MOVING AROUND A LOT MORE THAN USUAL: 3
6. FEELING BAD ABOUT YOURSELF - OR THAT YOU ARE A FAILURE OR HAVE LET YOURSELF OR YOUR FAMILY DOWN: 3
2. FEELING DOWN, DEPRESSED OR HOPELESS: 3
SUM OF ALL RESPONSES TO PHQ QUESTIONS 1-9: 25
10. IF YOU CHECKED OFF ANY PROBLEMS, HOW DIFFICULT HAVE THESE PROBLEMS MADE IT FOR YOU TO DO YOUR WORK, TAKE CARE OF THINGS AT HOME, OR GET ALONG WITH OTHER PEOPLE: 1
9. THOUGHTS THAT YOU WOULD BE BETTER OFF DEAD, OR OF HURTING YOURSELF: 1
SUM OF ALL RESPONSES TO PHQ QUESTIONS 1-9: 25

## 2020-01-24 ASSESSMENT — COLUMBIA-SUICIDE SEVERITY RATING SCALE - C-SSRS
2. HAVE YOU ACTUALLY HAD ANY THOUGHTS OF KILLING YOURSELF?: NO
6. HAVE YOU EVER DONE ANYTHING, STARTED TO DO ANYTHING, OR PREPARED TO DO ANYTHING TO END YOUR LIFE?: YES
7. DID THIS OCCUR IN THE LAST THREE MONTHS: OVER A YEAR AGO?
1. WITHIN THE PAST MONTH, HAVE YOU WISHED YOU WERE DEAD OR WISHED YOU COULD GO TO SLEEP AND NOT WAKE UP?: YES

## 2020-01-24 NOTE — PROGRESS NOTES
Alvin Schmidt is a 60 y.o. male that presents for     Chief Complaint   Patient presents with    3 Month Follow-Up     hasnt been taking bs; went to [facility] ER last wed for SOB- says didnt do anything for him    Shortness of Breath     gets it all the time- states its never ending gets it all the time    Swelling     been taking water pills and swelling hasnt gone done- right is worse- very painful, tingling and burning       BP (!) 146/74   Pulse 88   Temp 98.4 °F (36.9 °C) (Oral)   Resp 16   Ht 5' 10\" (1.778 m)   Wt (!) 407 lb (184.6 kg)   SpO2 95%   BMI 58.40 kg/m²       HPI:    MDD:  States that he is feeling restless recently and has not been getting much sleep. Mood is 'ok'. Notes that he has had SI in the past, but none recently. He does follow with a psychologist.    COPD    HPI:  Was seen in ER last week for COPD exacerbation. Was started on prednisone and antibiotic. Current medication regimen - spiriva, albuterol, tried on trelegy at last visit, states that he did not notice a difference with this. Does feel like the duonebs works better for him. Compliant with medications? yes    Limitations in function - getting worse recently, SOB with minimal activity  Does patient smoke? No    Chronic cough?: no  Chest pain/Tightness?:  no  Shortness of breath?: yes  Wheezing?  no    Last PFTs - 7/18    Hospitalized and/or intubated in the past?: No  Number of times prescribed oral steroids in the past year - 0  Influenza vaccine up to date? Yes  Pneumococcal vaccine up to date? Yes      Diabetes Type 2      Glucose control:   Does patient check blood glucoses at home? No  Report of hypoglycemia: no  Lab Results   Component Value Date    LABA1C 9.5 01/24/2020     No results found for: EAG    Symptoms  Polyuria, Polydipsia or Polyphagia? Yes-polyphagia  Chest Pain, SOB, or Palpitations? -  No  New Vision complaints? No  Paresthesias of the extremities?   No    Medications  Current medication were reviewed. Compliant with medications? yes  Medication side effects? No  On ACE-I or ARB? Yes  On antiplatelet therapy? Yes  On Statin? Yes    Exercise  Exercise? No  Wt Readings from Last 3 Encounters:   01/24/20 (!) 407 lb (184.6 kg)   11/26/19 (!) 404 lb 6.4 oz (183.4 kg)   10/24/19 (!) 404 lb (183.3 kg)       Blood pressure control:  BP Readings from Last 3 Encounters:   01/24/20 (!) 146/74   10/24/19 (!) 146/86   07/24/19 132/78       No results found for: Bryanna Hickey    Lab Results   Component Value Date    [address] 111 10/03/2019       HTN    Does patient check BP regularly at home? - No  Current Medication regimen - Bumex, lisinopril  Tolerating medications well? - yes    Shortness of breath or chest pain? No  Headache or visual complaints? No  Neurologic changes like confusion? No  Extremity edema? Yes - chronic issue, y.     BP Readings from Last 3 Encounters:   01/24/20 (!) 146/74   10/24/19 (!) 146/86   07/24/19 132/78       Health Maintenance   Topic Date Due    Hepatitis C screen  1955    Diabetic foot exam  05/02/1967    Diabetic retinal exam  05/02/1967    DTaP/Tdap/Td vaccine (1 - Tdap) 05/02/1968    HIV screen  05/02/1972    Shingles Vaccine (1 of 2) 05/02/2007    A1C test (Diabetic or Prediabetic)  01/24/2020    Colon cancer screen colonoscopy  04/17/2020    Lipid screen  10/03/2020    Potassium monitoring  01/15/2021    Creatinine monitoring  01/15/2021    Flu vaccine  Completed    Pneumococcal 0-64 years Vaccine  Completed       Past Medical History:   Diagnosis Date    Asthma     CHF (congestive heart failure) (HCC)     Chronic back pain     Chronic neck pain     Obesity        Past Surgical History:   Procedure Laterality Date    COLONOSCOPY  4/17/15    OTHER SURGICAL HISTORY      Had face surgery at 17     ROTATOR CUFF REPAIR Left 2007    Dr. Pippa Luna       Social History     Tobacco Use    Smoking status: Former Smoker     Packs/day: 0.50 Years: 40.00     Pack years: 20.00     Types: Cigarettes     Start date: 6/10/1974     Last attempt to quit: 3/20/2017     Years since quittin.8    Smokeless tobacco: Never Used    Tobacco comment: information given at past appts   Substance Use Topics    Alcohol use: Yes     Comment: very little    Drug use: No       Family History   Problem Relation Age of Onset    Arthritis Father     Stroke Father     Heart Attack Father          I have reviewed the patient's past medical history, past surgical history, allergies, medications, social and family history and I have made updates where appropriate. Review of Systems        PHYSICAL EXAM:  BP (!) 146/74   Pulse 88   Temp 98.4 °F (36.9 °C) (Oral)   Resp 16   Ht 5' 10\" (1.778 m)   Wt (!) 407 lb (184.6 kg)   SpO2 95%   BMI 58.40 kg/m²     General Appearance: well developed and well- nourished, in no acute distress  Head: normocephalic and atraumatic  ENT: external ear and ear canal normal bilaterally, nose without deformity, nasal mucosa and turbinates normal without polyps, oropharynx normal, dentition is normal for age, no lipor gum lesions noted  Neck: supple and non-tender without mass, no thyromegaly or thyroid nodules, no cervical lymphadenopathy  Pulmonary/Chest: wheezing noted in the bases bilaterally, normal air movement, no respiratory distress or retractions  Cardiovascular: normal rate, regular rhythm, normal S1 and S2, no murmurs, rubs, clicks, or gallops, distal pulses intact  Extremities: no cyanosis, clubbing of the lower extremities, severe lymphedema of the LEs biaterally  Psych:  Normal affect without evidence of depression oranxiety, insight and judgement are appropriate, memory appears intact  Skin: warm and dry, no rash or erythema      ASSESSMENT & PLAN  Harry Begum was seen today for 3 month follow-up, shortness of breath and swelling.     Diagnoses and all orders for this visit:    Type 2 diabetes mellitus without complication, without long-term current use of insulin ([facility])  -     POCT glycosylated hemoglobin (Hb A1C)    Chronic obstructive pulmonary disease, unspecified COPD type ([facility]  -     ipratropium-albuterol (DUONEB) 0.5-2.5 (3) MG/3ML SOLN nebulizer solution; Inhale 3 mLs into the lungs every 4 hours as needed for Shortness of Breath  -     6 Minute Walk Test; Future    Hidradenitis suppurativa    Dependent edema    HTN, goal below 140/90    Obesity, morbid, BMI 50 or higher ([facility])    Moderate episode of recurrent major depressive disorder ([facility] [address]    -He may qualify for O2, I tried to get him up to check his O2 today, we were not able to get very far, will try to get up for 6 min walk test, will also start Duonebs as this seems to have worked in the past for him  -A1c improved, but still uncontrolled, he has been doing much better with lifestyle management, will hold on further medications and recheck in 3 months.  -Other chronic issues are stable, continue current medications  -Advised to call if any issues            Return in about 3 months (around 4/24/2020), or if symptoms worsen or fail to improve. Controlled Substances Monitoring:                     Martha Abarca received counseling on the following healthy behaviors: medication adherence  Reviewed prior labs and health maintenance. Continue current medications, diet and exercise. Discussed use, benefit, and side effects of prescribed medications. Barriers to medication compliance addressed. Patient given educational materials - see patient instructions. All patient questions answered. Patient voiced understanding. On the basis of positive PHQ-9 screening (PHQ-9 Total Score: 25), the following plan was implemented: Addressed at office visit today, see my progress note for further details.

## 2020-01-24 NOTE — TELEPHONE ENCOUNTER
6 minute  walking test  ( repiratory ) 01/24/20    Order fxed to [phone]    Left detailed message on V,M  @ the  Pulmonary testing dept   See attached

## 2020-05-06 RX ORDER — GLIPIZIDE 10 MG/1
TABLET ORAL
Qty: 60 TABLET | OUTPATIENT
Start: 2020-05-06

## 2020-06-24 ENCOUNTER — OFFICE VISIT (OUTPATIENT)
Dept: FAMILY MEDICINE CLINIC | Age: 65
End: 2020-06-24
Payer: COMMERCIAL

## 2020-06-24 ENCOUNTER — CARE COORDINATION (OUTPATIENT)
Dept: CARE COORDINATION | Age: 65
End: 2020-06-24

## 2020-06-24 VITALS
HEIGHT: 70 IN | TEMPERATURE: 98 F | RESPIRATION RATE: 18 BRPM | HEART RATE: 79 BPM | OXYGEN SATURATION: 98 % | BODY MASS INDEX: 45.1 KG/M2 | DIASTOLIC BLOOD PRESSURE: 80 MMHG | SYSTOLIC BLOOD PRESSURE: 124 MMHG | WEIGHT: 315 LBS

## 2020-06-24 LAB — HBA1C MFR BLD: 12.4 % (ref 4.3–5.7)

## 2020-06-24 PROCEDURE — 3017F COLORECTAL CA SCREEN DOC REV: CPT | Performed by: FAMILY MEDICINE

## 2020-06-24 PROCEDURE — 3046F HEMOGLOBIN A1C LEVEL >9.0%: CPT | Performed by: FAMILY MEDICINE

## 2020-06-24 PROCEDURE — G8926 SPIRO NO PERF OR DOC: HCPCS | Performed by: FAMILY MEDICINE

## 2020-06-24 PROCEDURE — G8427 DOCREV CUR MEDS BY ELIG CLIN: HCPCS | Performed by: FAMILY MEDICINE

## 2020-06-24 PROCEDURE — 2022F DILAT RTA XM EVC RTNOPTHY: CPT | Performed by: FAMILY MEDICINE

## 2020-06-24 PROCEDURE — G8417 CALC BMI ABV UP PARAM F/U: HCPCS | Performed by: FAMILY MEDICINE

## 2020-06-24 PROCEDURE — 3023F SPIROM DOC REV: CPT | Performed by: FAMILY MEDICINE

## 2020-06-24 PROCEDURE — 1036F TOBACCO NON-USER: CPT | Performed by: FAMILY MEDICINE

## 2020-06-24 PROCEDURE — 99214 OFFICE O/P EST MOD 30 MIN: CPT | Performed by: FAMILY MEDICINE

## 2020-06-24 NOTE — CARE COORDINATION
I spoke with Yifan Fuller (wife and hippa) about getting Silverio's ventolin and farixga through the PAP program. I am sending them new applications to fill out and send back to me.         Frank Hernadez [address]   Medication Assistance  [facility] and [employer]    Q)[phone] (M)[phone]

## 2020-06-24 NOTE — PROGRESS NOTES
Karly Trammell is a 63 y.o. male that presents for     Chief Complaint   Patient presents with   [facility]     pt states he will do a Cologuard    Due for DM foot and eye exam        /80   Pulse 79   Temp 98 °F (36.7 °C) (Oral)   Resp 18   Ht 5' 9.5\" (1.765 m)   Wt (!) 396 lb (179.6 kg)   SpO2 98%   BMI 57.64 kg/m²       HPI:      COPD    HPI:      Current medication regimen - spiriva, albuterol, trelegy. Compliant with medications? yes    Limitations in function - severe, SOB with minimal activity  Does patient smoke? No    Chronic cough?: no  Chest pain/Tightness?:  no  Shortness of breath?: yes  Wheezing?  no    Last PFTs - 7/18    Hospitalized and/or intubated in the past?: No  Number of times prescribed oral steroids in the past year - 0  Influenza vaccine up to date? Yes  Pneumococcal vaccine up to date? Yes      Diabetes Type 2    States that he is only eating baked meats. Does have some sugary drinks through the day. Does like sweet tea and has been drinking up to 4 per day. Also, likes gatorade and powerade. Glucose control:   Does patient check blood glucoses at home? No  Report of hypoglycemia: no  Lab Results   Component Value Date    LABA1C 12.4 (H) 06/24/2020     No results found for: EAG    Symptoms  Polyuria, Polydipsia or Polyphagia? Yes-polyphagia  Chest Pain, SOB, or Palpitations? -  No  New Vision complaints? No  Paresthesias of the extremities? No    Medications  Current medication were reviewed. Compliant with medications? yes  Medication side effects? No  On ACE-I or ARB? Yes  On antiplatelet therapy? Yes  On Statin? Yes    Exercise  Exercise?  No  Wt Readings from Last 3 Encounters:   06/24/20 (!) 396 lb (179.6 kg)   01/24/20 (!) 407 lb (184.6 kg)   11/26/19 (!) 404 lb 6.4 oz (183.4 kg)       Blood pressure control:  BP Readings from Last 3 Encounters:   06/24/20 124/80   01/24/20 (!) 146/74   10/24/19 (!) 146/86 No results found for: Sierra Osorio    Lab Results   Component Value Date    [facility] 111 10/03/2019       HTN    Does patient check BP regularly at home? - No  Current Medication regimen - Bumex, lisinopril  Tolerating medications well? - yes    Shortness of breath or chest pain? No  Headache or visual complaints? No  Neurologic changes like confusion? No  Extremity edema?  Yes - chronic issue    BP Readings from Last 3 Encounters:   06/24/20 124/80   01/24/20 (!) 146/74   10/24/19 (!) 146/86       Health Maintenance   Topic Date Due    Hepatitis C screen  1957    Diabetic foot exam  05/02/1967    Diabetic retinal exam  05/02/1967    HIV screen  05/02/1972    DTaP/Tdap/Td vaccine (1 - Tdap) 05/02/1976    Shingles Vaccine (1 of 2) 05/02/2007    Colon cancer screen colonoscopy  04/17/2020    A1C test (Diabetic or Prediabetic)  04/24/2020    Lipid screen  10/03/2020    Potassium monitoring  01/15/2021    Creatinine monitoring  01/15/2021    Flu vaccine  Completed    Pneumococcal 0-64 years Vaccine  Completed    Hepatitis A vaccine  Aged Out    Hib vaccine  Aged Out    Meningococcal (ACWY) vaccine  Aged Out       Past Medical History:   Diagnosis Date    Asthma     CHF (congestive heart failure) (HCC)     Chronic back pain     Chronic neck pain     Obesity        Past Surgical History:   Procedure Laterality Date    COLONOSCOPY  4/17/15    OTHER SURGICAL HISTORY      Had face surgery at [address] [place] [address] 2007    Dr. Dilcia Dickson       Social History     Tobacco Use    Smoking status: Former Smoker     Packs/day: 0.50     Years: 40.00     Pack years: 20.00     Types: Cigarettes     Start date: 6/10/1974     Last attempt to quit: 3/20/2017     Years since quitting: 3.2    Smokeless tobacco: Never Used    Tobacco comment: information given at past appts   Substance Use Topics    Alcohol use: Yes     Comment: very little    Drug use: No       Family History   Problem

## 2020-07-03 ENCOUNTER — CARE COORDINATION (OUTPATIENT)
Dept: CARE COORDINATION | Age: 65
End: 2020-07-03

## 2020-07-03 NOTE — CARE COORDINATION
Preston Cortez called asking some questions about the paper work they received for the PAP program. I was able to answer her questions and they will get it sent back to me soon.         Madeleine Baker [address]   Medication Assistance  [facility] and [employer]    (S)[phone] (q)[phone]

## 2020-07-14 ENCOUNTER — TELEPHONE (OUTPATIENT)
Dept: FAMILY MEDICINE CLINIC | Age: 65
End: 2020-07-14

## 2020-07-14 NOTE — TELEPHONE ENCOUNTER
----- Message from Deepali Dillon DO sent at 7/14/2020  4:48 PM EDT -----  Cologuard test was normal.  I recommend repeating this in 3 years.

## 2020-07-16 ENCOUNTER — CARE COORDINATION (OUTPATIENT)
Dept: CARE COORDINATION | Age: 65
End: 2020-07-16

## 2020-07-16 NOTE — CARE COORDINATION
I got his applications back for assistance, I faxed his completed application into the manufacturers.         Brown Roa [facility]  [address] [facility]   Medication Assistance  [facility] and [employer]    (N)[phone]  (Y)[phone]

## 2020-07-27 ENCOUNTER — CARE COORDINATION (OUTPATIENT)
Dept: CARE COORDINATION | Age: 65
End: 2020-07-27

## 2020-07-27 NOTE — CARE COORDINATION
I was calling the manufacturers to check on the status of his applications for assistance. He was approved for his Ventolin and they are setting up shipment for him now. Az and me asked me to refax the application for his farxiga.       Maida Ruiz [facility]  [facility]   Medication Assistance  [employer]    (N)[phone]  (O)[phone]

## 2020-08-07 ENCOUNTER — CARE COORDINATION (OUTPATIENT)
Dept: CARE COORDINATION | Age: 65
End: 2020-08-07

## 2020-08-14 NOTE — TELEPHONE ENCOUNTER
14-Aug-2020 Spoke with patient about signing up for the assistance program for Ventolin. He currently does not have any insurance coverage and should be approved. I will be faxing over part of the application for the MD to sign for me.

## 2020-12-08 ENCOUNTER — NURSE TRIAGE (OUTPATIENT)
Dept: OTHER | Facility: CLINIC | Age: 65
End: 2020-12-08

## 2020-12-08 NOTE — TELEPHONE ENCOUNTER
Patient called pre-service center [facility]) Betzy Thakur with red flag complaint. Brief description of triage: Headache    Triage indicates for patient to be seen today or tomorrow. Care advice provided, patient verbalizes understanding; denies any other questions or concerns; instructed to call back for any new or worsening symptoms. Writer provided warm transfer to [place] at [facility] for appointment scheduling. Attention Provider: Thank you for allowing me to participate in the care of your patient. The patient was connected to triage in response to information provided to the [facility]. Please do not respond through this encounter as the response is not directed to a shared pool. Reason for Disposition   Unexplained headache that is present > 24 hours    Answer Assessment - Initial Assessment Questions  1. LOCATION: \"Where does it hurt? \"       \"around my eyes, right in the middle of my forehead\"    2. ONSET: \"When did the headache start? \" (Minutes, hours or days)       Patient states that he has been getting severe headaches on and off for the last couple weeks. 3. PATTERN: \"Does the pain come and go, or has it been constant since it started? \"      The pain is constant when has headache. 4. SEVERITY: \"How bad is the pain? \" and \"What does it keep you from doing? \"  (e.g., Scale 1-10; mild, moderate, or severe)    - MILD (1-3): doesn't interfere with normal activities     - MODERATE (4-7): interferes with normal activities or awakens from sleep     - SEVERE (8-10): excruciating pain, unable to do any normal activities         8/10    5. RECURRENT SYMPTOM: \"Have you ever had headaches before? \" If so, ask: \"When was the last time? \" and \"What happened that time? \"       \"Just over the last couple weeks. \"    6. CAUSE: \"What do you think is causing the headache? \"      \"Unsure. \"    7. MIGRAINE: \"Have you been diagnosed with migraine headaches? \" If so, ask: \"Is this headache similar? \"       No.    8. HEAD INJURY: \"Has there been any recent injury to the head? \"       No.    9. OTHER SYMPTOMS: \"Do you have any other symptoms? \" (fever, stiff neck, eye pain, sore throat, cold symptoms)     \" Dry cough\"    10. PREGNANCY: \"Is there any chance you are pregnant? \" \"When was your last menstrual period? \"        N/A    Protocols used: HEADACHE-ADULT-OH

## 2020-12-10 ENCOUNTER — TELEPHONE (OUTPATIENT)
Dept: FAMILY MEDICINE CLINIC | Age: 65
End: 2020-12-10

## 2020-12-10 NOTE — TELEPHONE ENCOUNTER
LMOM for Pt to call office back to reschedual Saturday 12/12 VV to a VV on 12/10  Due to pt sxs and nurse triage  per Dr. Recinos Cos it is okay to double book at 120 pm  Or at noon also okay to open 1 pm slot

## 2020-12-12 ENCOUNTER — VIRTUAL VISIT (OUTPATIENT)
Dept: FAMILY MEDICINE CLINIC | Age: 65
End: 2020-12-12
Payer: MEDICARE

## 2020-12-12 PROCEDURE — 99213 OFFICE O/P EST LOW 20 MIN: CPT | Performed by: FAMILY MEDICINE

## 2020-12-12 PROCEDURE — 3017F COLORECTAL CA SCREEN DOC REV: CPT | Performed by: FAMILY MEDICINE

## 2020-12-12 PROCEDURE — 2022F DILAT RTA XM EVC RTNOPTHY: CPT | Performed by: FAMILY MEDICINE

## 2020-12-12 PROCEDURE — 3046F HEMOGLOBIN A1C LEVEL >9.0%: CPT | Performed by: FAMILY MEDICINE

## 2020-12-12 PROCEDURE — G8427 DOCREV CUR MEDS BY ELIG CLIN: HCPCS | Performed by: FAMILY MEDICINE

## 2020-12-12 RX ORDER — FLUTICASONE PROPIONATE 50 MCG
2 SPRAY, SUSPENSION (ML) NASAL DAILY
Qty: 1 BOTTLE | Refills: 0 | Status: SHIPPED | OUTPATIENT
Start: 2020-12-12 | End: 2022-02-28

## 2020-12-12 RX ORDER — DOXYCYCLINE HYCLATE 100 MG
100 TABLET ORAL 2 TIMES DAILY
Qty: 20 TABLET | Refills: 0 | Status: SHIPPED | OUTPATIENT
Start: 2020-12-12 | End: 2020-12-22

## 2020-12-12 NOTE — PROGRESS NOTES
2020    TELEHEALTH EVALUATION -- Audio/Visual (During BIC-36 public health emergency)    HPI:    Elizabeth Patel (:  1955) has requested an audio/video evaluation for   Chief Complaint   Patient presents with    Headache   :      Headaches    HPI:  Notes that he is getting frontal headaches for the past 3-4 weeks. Description of headaches - pressure in the frontal area, wakes up feeling ok and then develops through the day  Frequency of Headaches - daily  Level of disability - moderate    Currently on prophylactic therapy? No  Taking abortive therapy? Yes - OTC meds with mild relief    Associated Aura? No  Photophobia, Phonophobia? No  Nausea or Vomiting? No  Recent change in Headaches? Yes - more frequent recently  Do headaches awaken him from sleep?   No      Review of Systems      Past Medical History:   Diagnosis Date    Asthma     CHF (congestive heart failure) (HCC)     Chronic back pain     Chronic neck pain     Obesity        Past Surgical History:   Procedure Laterality Date    COLONOSCOPY  4/17/15    OTHER SURGICAL HISTORY      Had face surgery at [address] [place] [address]     Dr. Tuyet Gibbons History     Socioeconomic History    Marital status:      Spouse name: Not on file    Number of children: Not on file    Years of education: Not on file    Highest education level: Not on file   Occupational History    Not on file   Social Needs    Financial resource strain: Not on file    Food insecurity     Worry: Not on file     Inability: Not on file    Transportation needs     Medical: Not on file     Non-medical: Not on file   Tobacco Use    Smoking status: Former Smoker     Packs/day: 0.50     Years: 40.00     Pack years: 20.00     Types: Cigarettes     Start date: 6/10/1974     Quit date: 3/20/2017     Years since quitting: 3.7    Smokeless tobacco: Never Used    Tobacco comment: information given at past appts Substance and Sexual Activity    Alcohol use: Yes     Comment: very little    Drug use: No    Sexual activity: Never   Lifestyle    Physical activity     Days per week: Not on file     Minutes per session: Not on file    Stress: Not on file   Relationships    Social connections     Talks on phone: Not on file     Gets together: Not on file     Attends Mandaeism service: Not on file     Active member of club or organization: Not on file     Attends meetings of clubs or organizations: Not on file     Relationship status: Not on file    Intimate partner violence     Fear of current or ex partner: Not on file     Emotionally abused: Not on file     Physically abused: Not on file     Forced sexual activity: Not on file   Other Topics Concern    Not on file   Social History Narrative    Not on file         No Known Allergies    Current Outpatient Medications on File Prior to Visit   Medication Sig Dispense Refill    dapagliflozin (FARXIGA) 10 MG tablet Take 1 tablet by mouth every morning 21 tablet 0    glipiZIDE (GLUCOTROL) 10 MG tablet take 1 tablet by mouth twice a day 180 tablet 3    ipratropium-albuterol (DUONEB) 0.5-2.5 (3) MG/3ML SOLN nebulizer solution Inhale 3 mLs into the lungs every 4 hours as needed for Shortness of Breath 360 mL 2    fluticasone-umeclidin-vilant (TRELEGY ELLIPTA) 100-62.5-25 MCG/INH AEPB Inhale 1 puff into the lungs daily 1 each 5    bumetanide (BUMEX) 1 MG tablet Take 2 tabs PO q AM and take 1 tabs PO qhs 90 tablet 5    albuterol sulfate HFA (VENTOLIN HFA) 108 (90 Base) MCG/ACT inhaler Inhale 2 puffs into the lungs every 6 hours as needed for Wheezing 3 Inhaler 3    metFORMIN (GLUCOPHAGE) 1000 MG tablet 1 tab PO daily 60 tablet 5    lisinopril (PRINIVIL;ZESTRIL) 10 MG tablet Take 1 tablet by mouth daily 90 tablet 3    pregabalin (LYRICA) 50 MG capsule Take 50 mg by mouth 2 times daily. Sameer Li baclofen (LIORESAL) 10 MG tablet Take 10 mg by mouth every evening  aspirin 81 MG tablet Take 81 mg by mouth daily      Tens Unit MISC by Does not apply route      Handicap Placard MISC by Does not apply route Expires 12/20/2022 1 each 0    DULoxetine (CYMBALTA) 60 MG capsule Take 60 mg by mouth daily.  Naproxen Sodium (ALEVE) 220 MG CAPS Take 2 tablets by mouth every 6 hours as needed for Pain. No current facility-administered medications on file prior to visit. PHYSICAL EXAMINATION:  Physical Exam  Nursing note reviewed. Constitutional:       Appearance: He is well-developed. Pulmonary:      Effort: Pulmonary effort is normal. No respiratory distress. Neurological:      Mental Status: He is alert. Psychiatric:         Behavior: Behavior normal.         Thought Content: Thought content normal.         Judgment: Judgment normal.           ASSESSMENT & PLAN  Jeremy Reynoso was seen today for headache. Diagnoses and all orders for this visit:    Frontal headache  -     doxycycline hyclate (VIBRA-TABS) 100 MG tablet; Take 1 tablet by mouth 2 times daily for 10 days  -     fluticasone (FLONASE) 50 MCG/ACT nasal spray; 2 sprays by Nasal route daily    Microalbuminuria due to type 2 diabetes mellitus ([facility] [address]    -Suspect that the frontal headaches are sinusitis based on hx, will start above treatment, will let me know in 5 days if no improvement  -continue lisinopril for micoralbuminuria    Return if symptoms worsen or fail to improve. An  electronic signature was used to authenticate this note. --Mihai Navarrete DO on 12/12/2020 at 9:51 AM    {    Pursuant to the emergency declaration under the [facility] waiver authority and the [employer] Act, this Virtual  Visit was conducted, with patient's consent, to reduce the patient's risk of exposure to COVID-19 and provide continuity of care for an established patient.

## 2021-05-06 DIAGNOSIS — E11.9 TYPE 2 DIABETES MELLITUS WITHOUT COMPLICATION, WITHOUT LONG-TERM CURRENT USE OF INSULIN (HCC): ICD-10-CM

## 2021-05-06 RX ORDER — GLIPIZIDE 10 MG/1
TABLET ORAL
Qty: 180 TABLET | Refills: 3 | Status: SHIPPED | OUTPATIENT
Start: 2021-05-06 | End: 2022-04-28

## 2021-05-06 NOTE — TELEPHONE ENCOUNTER
Recent Visits  Date Type Provider Dept   06/24/20 Office Visit Jerome Hardwick, [facility]   01/24/20 Office Visit Jerome Hardwick DO [facility]   Showing recent visits within past 540 days with a meds authorizing provider and meeting all other requirements     Future Appointments  No visits were found meeting these conditions.    Showing future appointments within next 150 days with a meds authorizing provider and meeting all other requirements         Future Appointments   Date Time Provider Akira Garcia   5/26/2021  1:00 PM Darryl Lopez MD [facility]

## 2021-05-26 ENCOUNTER — VIRTUAL VISIT (OUTPATIENT)
Dept: PSYCHIATRY | Age: 66
End: 2021-05-26
Payer: COMMERCIAL

## 2021-05-26 DIAGNOSIS — F41.9 ANXIETY: ICD-10-CM

## 2021-05-26 DIAGNOSIS — R45.86 MOOD SWINGS: ICD-10-CM

## 2021-05-26 DIAGNOSIS — F32.A DEPRESSION, UNSPECIFIED DEPRESSION TYPE: Primary | ICD-10-CM

## 2021-05-26 DIAGNOSIS — F43.21 GRIEF: ICD-10-CM

## 2021-05-26 DIAGNOSIS — G89.21 CHRONIC PAIN DUE TO TRAUMA: ICD-10-CM

## 2021-05-26 PROCEDURE — 90792 PSYCH DIAG EVAL W/MED SRVCS: CPT | Performed by: PSYCHIATRY & NEUROLOGY

## 2021-05-26 RX ORDER — TRAZODONE HYDROCHLORIDE 100 MG/1
100 TABLET ORAL NIGHTLY
Qty: 30 TABLET | Refills: 1 | Status: SHIPPED | OUTPATIENT
Start: 2021-05-26 | End: 2021-07-22 | Stop reason: SDUPTHER

## 2021-05-26 RX ORDER — AMITRIPTYLINE HYDROCHLORIDE 50 MG/1
50 TABLET, FILM COATED ORAL NIGHTLY
Qty: 30 TABLET | Refills: 1 | Status: SHIPPED | OUTPATIENT
Start: 2021-05-26 | End: 2021-07-22 | Stop reason: SDUPTHER

## 2021-05-26 RX ORDER — ZOLPIDEM TARTRATE 5 MG/1
5 TABLET ORAL NIGHTLY PRN
COMMUNITY
End: 2021-07-22

## 2021-05-26 RX ORDER — DULOXETIN HYDROCHLORIDE 60 MG/1
60 CAPSULE, DELAYED RELEASE ORAL DAILY
Qty: 30 CAPSULE | Refills: 1 | Status: SHIPPED | OUTPATIENT
Start: 2021-05-26 | End: 2021-07-22 | Stop reason: SDUPTHER

## 2021-05-26 RX ORDER — TRAZODONE HYDROCHLORIDE 50 MG/1
50 TABLET ORAL NIGHTLY
COMMUNITY
End: 2021-05-26 | Stop reason: SDUPTHER

## 2021-05-26 NOTE — PROGRESS NOTES
[address] [facility] [id number] [phone]    New Patient Progress Note    Patient:  Elis Barnhart  YOB: 1955  PCP:  Dilcia Meek DO    Visit Date:  5/26/2021    TELEHEALTH EVALUATION -- Audio/Visual (During N- public health emergency)    Patient location: home  Physician location: home, [address]  This virtual visit was conducted via interactive, real-time video. Elis Barnhart is a 61 y.o. male who is presenting today as a new patient at [address] [place] [address]. Chief Complaint   Patient presents with    [facility]    Depression    Insomnia    Anxiety       HPI:   He presents alone. C/o anxiety, depression. Began around 8 years ago with work-related back injury. \"Everything went downhill\". Hasn't been able to work. Tried to do light duty but had to retire. Chronic pain which worsens mood. Trouble with limitations from his pain. Used to be busy and active. Now can't do much. Feels he's on too many medications. \"I don't know whether I'm coming or going. \"     Mood: feels \"blah\" all the time, worsening depression comes and goes  Feels good when around friends. Mood worsens when alone. Dips in mood can last 2-3 hours at a time. Endorses mood swings. Generally not prolonged periods of depressed mood. Sleep: endorses trouble, lays down for an hour then back up, pain wakes him up. Might nap for an hour in daytime. Anhedonia: endorses, used to ride motorcycle  Hopelessness/guilt: endorses  Fatigue: denies   Concentration: endorses trouble, trouble finishing things even small tasks  Appetite: denies trouble  SI?  Denies any suicidal thoughts, last time he had any suicidal thoughts was around a year ago after going through a bad divorce  SA/self injury hx: once, says he tried to hurt himself around 8 years ago around time of a divorce, won't disclose what he did  Carol/hypomania: denies     Anxiety: endorses some anxiety, worries about his fiancee and kids, anxiety does interfere with his daily life. If he tries to keep busy he does better keeping his mind occupied. When alone thoughts \"get to my head\". Irritable at times. Endorses trouble relaxing. Feels restless. Sometimes expects the worst.   Lost a lot of friends who have passed away, \"I take that to heart. \"   Lost his mom 2 months ago. Grieving that loss. Hard to let go of anxious thoughts. Panic: denies  OCD: particular about how things are done, denies compulsions, rituals or routines, denies obsessive or intrusive thoughts    Anger/Violence: denies anger he can't control. Tends to get more irritated with himself feeling limited. HI? no    Trauma: denies any h/o trauma or abuse    Psychosis: denies any h/o AVH/hallucinations    Substance use: denies    Started Cymbalta awhile back when he was seeing Dr. Ramona Villegas. Would like something to help motivation, energy.        Past Psychiatric History:  Inpatient: denies   Outpatient: sees Dr. Emily Daniels for therapy, saw Dr. Ramona Villegas and then saw Dr. Kvng Lang in [address] [place] [address] trials/adverse reactions: Cymbalta, Elavil, trazodone, Ambien      Past Medical History:  H/o seizure: denies  H/o TBI: denies    Allergies   Allergen Reactions    Prednisone Rash       Past Medical History:   Diagnosis Date    Asthma     CHF (congestive heart failure) (HCC)     Chronic back pain     Chronic neck pain     Obesity        Past Surgical History:   Procedure Laterality Date    COLONOSCOPY  4/17/15    OTHER SURGICAL HISTORY      Had face surgery at 17     ROTATOR CUFF REPAIR Left 2007    Dr. Gaetano Clancy         Current Outpatient Medications:     traZODone (DESYREL) 50 MG tablet, Take 50 mg by mouth nightly, Disp: , Rfl:     zolpidem (AMBIEN) 5 MG tablet, Take 5 mg by mouth nightly as needed for Sleep., Disp: , Rfl:     glipiZIDE (GLUCOTROL) 10 MG tablet, take 1 tablet by mouth MENTAL STATUS EXAM:    GENERAL  Build: Overweight    Hygiene:  Appropriate in casual dress   SENSORIUM Orientation: Place, Person, Time, & Situation     Consciousness: Alert    ATTENTION   Focused    RELATEDNESS  Cooperative    EYE CONTACT   Good    PSYCHOMOTOR  Normal    SPEECH Volume: Normal     Rate: Normal rate and at times down tone    Amplitude: Within normal limits   MOOD  Anxious and Dysphoric    AFFECT Range: Limited, mood congruent, social smile present    THOUGHT Process:  Goal-Directed     Content: no evidence of psychosis    COGNITION Insight: Good    Judgement:  Intact    MEMORY  Intact    INTELLIGENCE  Average     Mobility/Gait: Independently       ASSESSMENT: 61 y. o.M with h/o anxiety, depression, mood swings and chronic pain that began after work-related injury 8 years ago. Grief of losing his mom recently. Feels overmedicated. Denies SI/HI/psychosis/substances. Will stop Ambien and increase trazodone for sleep. He's on low doses of 3 different sleep aids. Will try to simplify regimen. In therapy. Will consider Wellbutrin, Lamictal going forward. 1. Depression, unspecified depression type    2. Chronic pain due to trauma    3. Anxiety    4. Grief    5. Mood swings    R/o OUSMANE  Medical Hx: COPD, CHF, chronic neck and back pain    PLAN:      Medications:   Stop Ambien 5 mg HS   Increase trazodone to 100 mg HS - for sleep and depression   Elavil 50 mg HS   Cymbalta 60 mg QD    Therapy: sees Dr. Finley Goodpasture    Labs/Tests/Imaging: none   Ordered:   Reviewed:    Safety: denies SI/HI/psychosis/substances    · Patient advised to call regarding any questions or difficulties with treatment. Return in about 4 weeks (around 6/23/2021) for med check, follow up.   · Records reviewed: [facility], referral records      Electronically signed by Malick Martinez MD on 6/8/2021 at 12:16 PM    Sherine Holden is a 61 y.o. male being evaluated by a Virtual Visit (video visit) to address concerns as mentioned

## 2021-07-22 ENCOUNTER — VIRTUAL VISIT (OUTPATIENT)
Dept: PSYCHIATRY | Age: 66
End: 2021-07-22
Payer: COMMERCIAL

## 2021-07-22 DIAGNOSIS — F43.21 GRIEF: ICD-10-CM

## 2021-07-22 DIAGNOSIS — R45.86 MOOD SWINGS: ICD-10-CM

## 2021-07-22 DIAGNOSIS — F41.9 ANXIETY: ICD-10-CM

## 2021-07-22 DIAGNOSIS — G89.21 CHRONIC PAIN DUE TO TRAUMA: ICD-10-CM

## 2021-07-22 DIAGNOSIS — F32.A DEPRESSION, UNSPECIFIED DEPRESSION TYPE: Primary | ICD-10-CM

## 2021-07-22 PROCEDURE — 3017F COLORECTAL CA SCREEN DOC REV: CPT | Performed by: PSYCHIATRY & NEUROLOGY

## 2021-07-22 PROCEDURE — G8427 DOCREV CUR MEDS BY ELIG CLIN: HCPCS | Performed by: PSYCHIATRY & NEUROLOGY

## 2021-07-22 PROCEDURE — 99214 OFFICE O/P EST MOD 30 MIN: CPT | Performed by: PSYCHIATRY & NEUROLOGY

## 2021-07-22 RX ORDER — AMITRIPTYLINE HYDROCHLORIDE 50 MG/1
50 TABLET, FILM COATED ORAL NIGHTLY
Qty: 30 TABLET | Refills: 2 | Status: SHIPPED | OUTPATIENT
Start: 2021-07-22 | End: 2021-09-29 | Stop reason: SDUPTHER

## 2021-07-22 RX ORDER — DULOXETIN HYDROCHLORIDE 30 MG/1
30 CAPSULE, DELAYED RELEASE ORAL DAILY
Qty: 30 CAPSULE | Refills: 2 | Status: SHIPPED | OUTPATIENT
Start: 2021-07-22 | End: 2021-09-29

## 2021-07-22 RX ORDER — BUPROPION HYDROCHLORIDE 150 MG/1
150 TABLET ORAL EVERY MORNING
Qty: 30 TABLET | Refills: 2 | Status: SHIPPED | OUTPATIENT
Start: 2021-07-22 | End: 2021-09-29 | Stop reason: SDUPTHER

## 2021-07-22 RX ORDER — TRAZODONE HYDROCHLORIDE 100 MG/1
100 TABLET ORAL NIGHTLY
Qty: 30 TABLET | Refills: 2 | Status: SHIPPED | OUTPATIENT
Start: 2021-07-22 | End: 2021-09-29 | Stop reason: SDUPTHER

## 2021-07-22 NOTE — PROGRESS NOTES
[address] [facility] PSYCHIATRY  [facility] [id number] [phone]    Progress Note    Patient:  Daphne Kim  YOB: 1955  PCP:  Jenaro Paul DO  Visit Date:  7/22/2021    TELEHEALTH EVALUATION -- Audio/Visual (During HRCUU-07 public health emergency)    Patient location: home  Physician location: [place], [facility]  This virtual visit was conducted via interactive, real-time video. Chief Complaint   Patient presents with    Follow-up    Medication Check    Depression    Anxiety    Pain       SUBJECTIVE:      Daphne Kim, a 61 y.o. male, presents for a follow up visit. Patient reports he is stable. Patient is compliant with medication regimen. He presents alone. Doing fairly well. Spends a lot of time working in the yard. Helps clear his anxious, negative thoughts. Sleep is ok. Still up a couple times per night for bathroom. Feels \"drained\" by Cymbaltaj. Notes more fatigue since starting it. No benefit from it. Doesn't think it improved his pain. Needs something to \"perk up\". Will start something and not finish. No motivation, drive, or interest.   No longer taking Ambien. We increased trazodone to 100 mg. \"I feel better\". Still no energy. Unclear if sleep or energy is improved with that change. Nap all the time. Tired so easily. \"I do a lot of sitting. \" Needs frequent breaks and naps in afternoon. Used to be very active until his work injury 8 years ago. Depressed mood \"come and go\". Notes good and bad days. When down isolates himself. Helps to spend time with his friends. Still grieving recent loss of a few family and friends. No suicidal thoughts. Notes in  he past when it happened was going through a divorce. Notes therapy has helped a lot. Discussed tx options and we agree to begin reducing Cymbalta. He would like to try Wellbutrin.        Med Trials:Cymbalta, Elavil, trazodone, Ambien    OBJECTIVE:  Vitals: There were no vitals taken for this visit. MENTAL STATUS EXAM:    GENERAL  Build: Overweight    Hygiene:  Appropriate    SENSORIUM Orientation: Place, Person, Time, & Situation     Consciousness: Alert    ATTENTION   Focused    RELATEDNESS  Cooperative    EYE CONTACT   Good    PSYCHOMOTOR  Normal    SPEECH Volume: Normal     Rate: Normal rate and at times down tone    Amplitude: Within normal limits   MOOD  Dysphoric, somewhat   AFFECT Range: Full , tearful at times   THOUGHT Process:  Goal-Directed     Content: no evidence of psychosis    COGNITION Insight: Good    Judgement:  Intact    MEMORY  Intact    INTELLIGENCE  Average     Mobility/Gait: Independently     Controlled SubstancesMonitoring:   not done today      ASSESSMENT: Will begin reducing Cymbalta to see if causing fatigue. Will start Wellbutrin for mood, motivation, energy, focus. No SI. Diagnosis Orders   1. Depression, unspecified depression type     2. Chronic pain due to trauma     3. Anxiety     4. Grief     5. Mood swings     R/o OUSMANE  Medical Hx: COPD, CHF, chronic neck and back pain    PLAN:     · Medications:   · Trazodone 100 mg HS  · Elavil 50 mg HS  · Decrease Cymbalta to 30 mg QD (from 60 mg)  · Start Wellbutrin  mg QAM - discussed r/b/se/a  · Therapy: sees Dr. Olman Jameson  · Labs/Tests/Imaging: none   · Records Reviewed: [facility]  · Patient advised to call if patient has any difficulties with treatment  Return in about 4 weeks (around 8/19/2021) for med check, follow up. Electronically signed by Vincent Nunez MD on 7/22/2021 at 10:58 AM    Elizabeth Patel is a 61 y.o. male being evaluated by a Virtual Visit (video visit) to address concerns as mentioned above. A caregiver was present when appropriate.  Due to this being a TeleHealth encounter (KGNPW-52 public health emergency), evaluation of the following organ systems was limited: Vitals/Constitutional/EENT/Resp/CV/GI//MS/Neuro/Skin/Heme-Lymph-Imm. Pursuant to the emergency declaration under the [address] [place] [address] and the [employer] Act, this Virtual Visit was conducted with patient's (and/or legal guardian's) consent, to reduce the patient's risk of exposure to COVID-19 and provide necessary medical care. The patient (and/or legal guardian) has also been advised to contact this office for worsening conditions or problems, and seek emergency medical treatment and/or call 911 if deemed necessary. Patient identification was verified at the start of the visit: Yes     Total time spent for this encounter: not billed by time     Services were provided through a video synchronous discussion virtually to substitute for in-person clinic visit. Patient and provider were located at their individual homes.     Electronically signed by Kelli Osborn MD on 7/22/2021 at 10:58 AM

## 2021-09-29 ENCOUNTER — VIRTUAL VISIT (OUTPATIENT)
Dept: PSYCHIATRY | Age: 66
End: 2021-09-29
Payer: COMMERCIAL

## 2021-09-29 DIAGNOSIS — R45.86 MOOD SWINGS: ICD-10-CM

## 2021-09-29 DIAGNOSIS — G89.21 CHRONIC PAIN DUE TO TRAUMA: ICD-10-CM

## 2021-09-29 DIAGNOSIS — F41.9 ANXIETY: ICD-10-CM

## 2021-09-29 DIAGNOSIS — F32.A DEPRESSION, UNSPECIFIED DEPRESSION TYPE: Primary | ICD-10-CM

## 2021-09-29 PROCEDURE — 99214 OFFICE O/P EST MOD 30 MIN: CPT | Performed by: PSYCHIATRY & NEUROLOGY

## 2021-09-29 PROCEDURE — 3017F COLORECTAL CA SCREEN DOC REV: CPT | Performed by: PSYCHIATRY & NEUROLOGY

## 2021-09-29 PROCEDURE — G8427 DOCREV CUR MEDS BY ELIG CLIN: HCPCS | Performed by: PSYCHIATRY & NEUROLOGY

## 2021-09-29 RX ORDER — BUPROPION HYDROCHLORIDE 300 MG/1
300 TABLET ORAL EVERY MORNING
Qty: 30 TABLET | Refills: 2 | Status: SHIPPED | OUTPATIENT
Start: 2021-09-29 | End: 2021-12-01 | Stop reason: SDUPTHER

## 2021-09-29 RX ORDER — TRAZODONE HYDROCHLORIDE 100 MG/1
100 TABLET ORAL NIGHTLY
Qty: 30 TABLET | Refills: 2 | Status: SHIPPED | OUTPATIENT
Start: 2021-09-29 | End: 2021-12-01 | Stop reason: SDUPTHER

## 2021-09-29 RX ORDER — AMITRIPTYLINE HYDROCHLORIDE 50 MG/1
50 TABLET, FILM COATED ORAL NIGHTLY
Qty: 30 TABLET | Refills: 2 | Status: SHIPPED | OUTPATIENT
Start: 2021-09-29 | End: 2021-12-01 | Stop reason: SDUPTHER

## 2021-09-29 NOTE — PROGRESS NOTES
[address] [facility] PSYCHIATRY  [facility] [id number] [phone]    Progress Note    Patient:  Nj Zambrano  YOB: 1955  PCP:  Regla Ledezma DO  Visit Date:  9/29/2021    TELEHEALTH EVALUATION -- Audio/Visual (During ZIY-43 public health emergency)    Patient location: home  Physician location: [place], [facility]  This virtual visit was conducted via interactive, real-time video. Chief Complaint   Patient presents with    Follow-up    Medication Check    Anxiety    Depression    Mood Swings       SUBJECTIVE:      Nj Zambrano, a 61 y.o. male, presents for a follow up visit. Patient reports he is not doing well. Patient is compliant with medication regimen. He presents alone. Not feeling so well. More back pain with weather and seasonal changes. Getting a little better the last few days. \"Stuck\" at home last 3 weeks. His truck is in shop. Can't wait to get out and do more. He c/o weight gain. Thinks it's from Lyrica. We discussed it's unlikely to be from Wellbutrin or trazodone. Wt gain possible with Elavil. Not sleeping well. CPAP hasn't changed. No longer taking Cymbalta. Last took it about a month ago. Feels the same going off it. No worse. Unclear if fatigue is any better. Stays busy which keeps his mind off things, less anxious. Some days mood feels depressed. \"this is not where I want to be at this part of my life\". Resists that he's physically unable to do more. In the past was active and now \"down to a screeching halt\". Goes to bed at 9pm, up and down all night. Naps in afternoon around 2pm x 45 min. Does notice some energy benefit with Wellbutrin. Discussed tx options and we agree to try dose increase for mood, energy, motivation. No SI.       Med Trials:Cymbalta (fatigue? No benefit), Elavil, trazodone, Ambien, Wellbutrin    OBJECTIVE:  Vitals:  There were no vitals taken for this visit. MENTAL STATUS EXAM:    GENERAL  Build: Overweight    Hygiene:  Appropriate in casual dress   SENSORIUM Orientation: Place, Person, Time, & Situation     Consciousness: Alert    ATTENTION   Focused    RELATEDNESS  Cooperative    EYE CONTACT   Good    PSYCHOMOTOR  Normal    SPEECH Volume: Normal     Rate: Normal rate and down, somewhat irritable tone    Amplitude: Within normal limits   MOOD  Dysphoric   AFFECT Range: Limited, mood congruent, social smile present   THOUGHT Process:  Goal-Directed     Content: no evidence of psychosis    COGNITION Insight: Good    Judgement:  Intact    MEMORY  Intact    INTELLIGENCE  Average     Mobility/Gait: Independently     Controlled SubstancesMonitoring:   not done today      ASSESSMENT: Will increase Wellbutrin for mood, energy, motivation. Some energy benefit so far. Doing ok off Cymbalta. Diagnosis Orders   1. Depression, unspecified depression type     2. Anxiety     3. Chronic pain due to trauma     4. Mood swings     R/o OUSMANE  Medical Hx: COPD, CHF, chronic neck and back pain    PLAN:     · Medications:   · Trazodone 100 mg HS  · Elavil 50 mg HS  · No longer taking Cymbalta  · Increase Wellbutrin  mg QAM to 300 mg QAM  · Therapy: sees Dr. Fred Mandel  · Labs/Tests/Imaging: none   · Records Reviewed: [facility]  · Patient advised to call if patient has any difficulties with treatment  Return in about 4 weeks (around 10/27/2021) for med check, follow up. Electronically signed by Bear Carpio MD on 9/29/2021 at [address] [place] [address] is a 61 y.o. male being evaluated by a Virtual Visit (video visit) to address concerns as mentioned above. A caregiver was present when appropriate. Due to this being a TeleHealth encounter (TJEQT-37 public health emergency), evaluation of the following organ systems was limited: Vitals/Constitutional/EENT/Resp/CV/GI//MS/Neuro/Skin/Heme-Lymph-Imm.   Pursuant to the emergency declaration

## 2021-12-01 ENCOUNTER — VIRTUAL VISIT (OUTPATIENT)
Dept: PSYCHIATRY | Age: 66
End: 2021-12-01
Payer: COMMERCIAL

## 2021-12-01 DIAGNOSIS — F43.21 GRIEF: ICD-10-CM

## 2021-12-01 DIAGNOSIS — G89.21 CHRONIC PAIN DUE TO TRAUMA: ICD-10-CM

## 2021-12-01 DIAGNOSIS — R45.86 MOOD SWINGS: ICD-10-CM

## 2021-12-01 DIAGNOSIS — F32.A DEPRESSION, UNSPECIFIED DEPRESSION TYPE: Primary | ICD-10-CM

## 2021-12-01 DIAGNOSIS — F41.9 ANXIETY: ICD-10-CM

## 2021-12-01 PROCEDURE — 3017F COLORECTAL CA SCREEN DOC REV: CPT | Performed by: PSYCHIATRY & NEUROLOGY

## 2021-12-01 PROCEDURE — 99214 OFFICE O/P EST MOD 30 MIN: CPT | Performed by: PSYCHIATRY & NEUROLOGY

## 2021-12-01 PROCEDURE — G8427 DOCREV CUR MEDS BY ELIG CLIN: HCPCS | Performed by: PSYCHIATRY & NEUROLOGY

## 2021-12-01 RX ORDER — AMITRIPTYLINE HYDROCHLORIDE 50 MG/1
50 TABLET, FILM COATED ORAL NIGHTLY
Qty: 30 TABLET | Refills: 3 | Status: SHIPPED | OUTPATIENT
Start: 2021-12-01 | End: 2022-06-06 | Stop reason: SDUPTHER

## 2021-12-01 RX ORDER — BUPROPION HYDROCHLORIDE 300 MG/1
300 TABLET ORAL EVERY MORNING
Qty: 30 TABLET | Refills: 3 | Status: SHIPPED | OUTPATIENT
Start: 2021-12-01 | End: 2022-05-02 | Stop reason: SDUPTHER

## 2021-12-01 RX ORDER — TRAZODONE HYDROCHLORIDE 100 MG/1
100 TABLET ORAL NIGHTLY
Qty: 30 TABLET | Refills: 3 | Status: SHIPPED | OUTPATIENT
Start: 2021-12-01 | End: 2022-06-06 | Stop reason: SDUPTHER

## 2021-12-01 NOTE — PROGRESS NOTES
[address] [facility] PSYCHIATRY  [facility] [id number] [phone]    Progress Note    Patient:  Aracelis Dickerson  YOB: 1955  PCP:  Diana Bishop DO  Visit Date:  12/1/2021    TELEHEALTH EVALUATION -- Audio/Visual ([facility])    Patient location: home  Physician location: [place], [facility]  This virtual visit was conducted via interactive, real-time video. Chief Complaint   Patient presents with    Follow-up    Medication Check    Anxiety    Depression    Mood Swings    Chronic Pain       SUBJECTIVE:    Time in: 8:26am  Time out: 8:52am  Documentation time: 5 min    Aracelis Dickerson, a 61 y.o. male, presents for a follow up visit. Patient reports he is improving. Patient is compliant with medication regimen. He presents alone. Doing \"okay\". Has a migraine today which is starting to improve. Notes the Wellbutrin increase has improved his mood. Less depressed and mood swings are improved. Feels more effective than Cymbalta. Energy is better. Pharmacy still giving him old Cymbalta Rx. Advised I will tell them to cancel any previous orders. Still up and down overnight. Lost several friends and family in the last month. Aunt, uncle, 3 close friends. Grieving those losses. Very close friend with pancreatic cancer. Another friend with heart attack. Hard to deal with the pain. Migraines are unchanged and no worse with Wellbutrin. Responds to medication. Uses Aleve. HA not coming on \"every day like it used to\". Feels related to higher stress lately. Takes Wellbutrin in morning. Dislikes being around people and crowds. Tends to stay home. Doesn't want to get sick. Some days are better than others. Canceled his last appt with Dr. Ayden Larry d/t his wife's medical appt but has appt with him on 12/8. Prefers to make no med changes today.    Was down for a week with suspected food poisoning. Med Trials:Cymbalta (fatigue? No benefit), Elavil, trazodone, Ambien, Wellbutrin    OBJECTIVE:  Vitals: There were no vitals taken for this visit. MENTAL STATUS EXAM:    GENERAL  Build: Overweight    Hygiene:  Appropriate   SENSORIUM Orientation: Place, Person, Time, & Situation     Consciousness: Alert    ATTENTION   Focused    RELATEDNESS  Cooperative    EYE CONTACT   Good    PSYCHOMOTOR  Normal    SPEECH Volume: Normal     Rate: Normal rate and at times down tone    Amplitude: Within normal limits   MOOD  Euthymic   AFFECT Range: Limited, mood congruent   THOUGHT Process:  Goal-Directed     Content: no evidence of psychosis    COGNITION Insight: Good    Judgement:  Intact    MEMORY  Intact    INTELLIGENCE  Average     Mobility/Gait: Independently     Controlled SubstancesMonitoring:   not done today      ASSESSMENT: No change today. Wellbutrin improved depression and mood swings. Grieving loss of several friends and family. Diagnosis Orders   1. Depression, unspecified depression type     2. Anxiety     3. Chronic pain due to trauma     4. Mood swings     5. Grief     R/o OUSMANE  Medical Hx: COPD, CHF, chronic neck and back pain    PLAN:     · Medications:   · Trazodone 100 mg HS  · Elavil 50 mg HS  · Wellbutrin  mg QAM  · Therapy: sees Dr. Mitesh Yusuf  · Labs/Tests/Imaging: none   · Records Reviewed: [facility]  · Patient advised to call if patient has any difficulties with treatment  Return in about 3 months (around 3/1/2022) for med check, follow up. Electronically signed by Steven Lang MD on 12/1/2021 at 8:54 AM    Jackie Luke is a 61 y.o. male being evaluated by a Virtual Visit (video visit) to address concerns as mentioned above. A caregiver was present when appropriate.  Due to this being a TeleHealth encounter ([facility]KGA-04 public health emergency), evaluation of the following organ systems was limited: Vitals/Constitutional/EENT/Resp/CV/GI//MS/Neuro/Skin/Heme-Lymph-Imm. Pursuant to the emergency declaration under the [address] [place] [address] and the [employer] and [employer] Act, this Virtual Visit was conducted with patient's (and/or legal guardian's) consent, to reduce the patient's risk of exposure to COVID-19 and provide necessary medical care. The patient (and/or legal guardian) has also been advised to contact this office for worsening conditions or problems, and seek emergency medical treatment and/or call 911 if deemed necessary. Patient identification was verified at the start of the visit: Yes     Total time spent for this encounter: 31 minutes     Services were provided through a video synchronous discussion virtually to substitute for in-person clinic visit. Patient and provider were located at their individual homes.     Electronically signed by Christ Ganser, MD on 12/1/2021 at 8:54 AM

## 2021-12-11 ENCOUNTER — TELEMEDICINE (OUTPATIENT)
Dept: FAMILY MEDICINE CLINIC | Age: 66
End: 2021-12-11
Payer: MEDICARE

## 2021-12-11 DIAGNOSIS — U07.1 2019 NOVEL CORONAVIRUS DISEASE (COVID-19): Primary | ICD-10-CM

## 2021-12-11 DIAGNOSIS — E11.9 TYPE 2 DIABETES MELLITUS WITHOUT COMPLICATION, WITHOUT LONG-TERM CURRENT USE OF INSULIN (HCC): ICD-10-CM

## 2021-12-11 DIAGNOSIS — J44.1 COPD EXACERBATION (HCC): ICD-10-CM

## 2021-12-11 DIAGNOSIS — I50.23 ACUTE ON CHRONIC SYSTOLIC CONGESTIVE HEART FAILURE (HCC): ICD-10-CM

## 2021-12-11 PROCEDURE — 1111F DSCHRG MED/CURRENT MED MERGE: CPT | Performed by: FAMILY MEDICINE

## 2021-12-11 PROCEDURE — 99496 TRANSJ CARE MGMT HIGH F2F 7D: CPT | Performed by: FAMILY MEDICINE

## 2021-12-11 RX ORDER — CEFDINIR 300 MG/1
300 CAPSULE ORAL EVERY 12 HOURS
COMMUNITY
Start: 2021-12-09 | End: 2021-12-11 | Stop reason: SDUPTHER

## 2021-12-11 RX ORDER — CEFDINIR 300 MG/1
300 CAPSULE ORAL EVERY 12 HOURS
Qty: 6 CAPSULE | Refills: 0 | Status: SHIPPED | OUTPATIENT
Start: 2021-12-11 | End: 2021-12-14

## 2021-12-11 RX ORDER — DEXAMETHASONE 6 MG/1
6 TABLET ORAL
Qty: 7 TABLET | Refills: 0 | Status: SHIPPED | OUTPATIENT
Start: 2021-12-11 | End: 2021-12-18

## 2021-12-11 NOTE — PROGRESS NOTES
Transition of Care Visit/Hospital Follow Up:      Humphrey Bravo is a 61 y.o. male that presents for Follow-Up from Hospital (covid)        Date of Discharge:   12/9/21  Was patient contacted within 2 business days of discharge (see chart for documentation):  yes - today      Patient presents for hospital follow up. Patient recently hospitalized at [facility]  for treatment of [address]. Symptoms prior to admission:  Cough, SOB     Hospital Course per DC Summary:    Evans Page is a 61 y. o. male patient of Sury Calabrese DO with history of CHF, Asthma, COPD, HTN, ARYA presented with COVID-19     Covid-19 Virus Infection  COPD  Date of onset of symptoms: 11/30  Symptoms present on admission: Shortness of Breath  Date of covid positive test: 12/7  Vaccination status: unvaccinated  Imaging:  CXR - no findings  Oxygen requirements on admission: yes   Current oxygen requirements: 2L  Medical therapy: remdesivir and steroids  Consultants following: none as of now  Anticipated special isolation end date: 12/17  Start cefdinir for COPD  Significant clinical improvement     Lactic Acidosis, resolved  Lactate 2.7 - 1.5  Likely 2/2 to COVID with metformin use possibly contributing      NIDDM II  Hold home orals  SSI and diabetic diet  Will start lantus due to steroid use and hyperglycemia   A1c 10.9 - follow-up with PCP   Will increase metformin to BID      CHF / HTN   Continue home bumex, lisinopril, ASA        Medication changes at discharge:  Med list reconciled today    Clinical course since discharge:  Per partner Candace Lopes, his temp was 101.9 last night. States that he is feeling very weak overall. Notes that he is still feeling short of breath. Will get nauseous with coughing. Has not been eating much. Has lost quite a it of weight from his admission due to diuresis. Is not on a steroid since D/C, was on decadron for several days in the hospital.  Per Kashif, was also not given the omnicef.       Current Outpatient Medications   Medication Sig Dispense Refill    dexamethasone (DECADRON) 6 MG tablet Take 1 tablet by mouth daily (with breakfast) for 7 days 7 tablet 0    cefdinir (OMNICEF) 300 MG capsule Take 1 capsule by mouth every 12 hours for 3 days 6 capsule 0    traZODone (DESYREL) 100 MG tablet Take 1 tablet by mouth nightly 30 tablet 3    buPROPion (WELLBUTRIN XL) 300 MG extended release tablet Take 1 tablet by mouth every morning 30 tablet 3    amitriptyline (ELAVIL) 50 MG tablet Take 1 tablet by mouth nightly 30 tablet 3    glipiZIDE (GLUCOTROL) 10 MG tablet take 1 tablet by mouth twice a day 180 tablet 3    fluticasone (FLONASE) 50 MCG/ACT nasal spray 2 sprays by Nasal route daily 1 Bottle 0    ipratropium-albuterol (DUONEB) 0.5-2.5 (3) MG/3ML SOLN nebulizer solution Inhale 3 mLs into the lungs every 4 hours as needed for Shortness of Breath 360 mL 2    bumetanide (BUMEX) 1 MG tablet Take 2 tabs PO q AM and take 1 tabs PO qhs 90 tablet 5    albuterol sulfate HFA (VENTOLIN HFA) 108 (90 Base) MCG/ACT inhaler Inhale 2 puffs into the lungs every 6 hours as needed for Wheezing 3 Inhaler 3    metFORMIN (GLUCOPHAGE) 1000 MG tablet 1 tab PO daily 60 tablet 5    lisinopril (PRINIVIL;ZESTRIL) 10 MG tablet Take 1 tablet by mouth daily 90 tablet 3    pregabalin (LYRICA) 50 MG capsule Take 75 mg by mouth 3 times daily.  aspirin 81 MG tablet Take 81 mg by mouth daily      Tens Unit MISC by Does not apply route      Handicap Placard MISC by Does not apply route Expires 12/20/2022 1 each 0     No current facility-administered medications for this visit.        Past Medical History:   Diagnosis Date    Asthma     CHF (congestive heart failure) (HCC)     Chronic back pain     Chronic neck pain     Obesity        Past Surgical History:   Procedure Laterality Date    COLONOSCOPY  4/17/15    OTHER SURGICAL HISTORY      Had face surgery at [address] [place] [address] Left 2007    Dr. Joan Carrera History     Tobacco Use    Smoking status: Former Smoker     Packs/day: 0.50     Years: 40.00     Pack years: 20.00     Types: Cigarettes     Start date: 6/10/1974     Quit date: 3/20/2017     Years since quittin.7    Smokeless tobacco: Never Used    Tobacco comment: information given at past appts   Substance Use Topics    Alcohol use: Yes     Comment: very little    Drug use: No       Family History   Problem Relation Age of Onset    Arthritis Father     Stroke Father     Heart Attack Father          I have reviewed the patient's past medical history, past surgical history, allergies, medications, social and family history and I have made updates where appropriate. PHYSICAL EXAM:  Physical Exam  Nursing note reviewed. Constitutional:       Appearance: He is well-developed. He is ill-appearing. Pulmonary:      Effort: Pulmonary effort is normal. No respiratory distress. Neurological:      Mental Status: He is alert. Psychiatric:         Behavior: Behavior normal.         Thought Content: Thought content normal.         Judgment: Judgment normal.             Diagnostic test results reviewed: inpatient labs    Patient risk of morbidity and mortality: high      ASSESSMENT & PLAN  Jay Flores was seen today for follow-up from hospital.    Diagnoses and all orders for this visit:    2019 novel coronavirus disease (COVID-19)  -     dexamethasone (DECADRON) 6 MG tablet; Take 1 tablet by mouth daily (with breakfast) for 7 days  -     cefdinir (OMNICEF) 300 MG capsule;  Take 1 capsule by mouth every 12 hours for 3 days  -     MD DISCHARGE MEDS RECONCILED W/ CURRENT OUTPATIENT MED LIST    COPD exacerbation ([facility]  -     MD DISCHARGE MEDS RECONCILED W/ CURRENT OUTPATIENT MED LIST    Acute on chronic systolic congestive heart failure (HCC)  -     MD DISCHARGE MEDS RECONCILED W/ CURRENT OUTPATIENT MED LIST    Type 2 diabetes mellitus without complication, without long-term current use of insulin ([facility]  - [place] DISCHARGE MEDS RECONCILED W/ CURRENT OUTPATIENT MED LIST        Return in about 2 days (around 12/13/2021), or if symptoms worsen or fail to improve. Level of medical complexity:  high    -Overall, patient is stable, but very high risk for readmission. Advised patient on having a very low threshold to return to ER. Start on decadron and omnicef today. I will follow up with him in 2 days.  -Continue current meds  -Advised to continue to closely monitor her symptoms and if any worsening to seek treatment    All copied or forwarded information in the progress note was verified by me to be accurate at the time of visit  Patient's past medical, surgical, social and family history were reviewed and updated    Manteno Getting, was evaluated through a synchronous (real-time) audio-video encounter. The patient (or guardian if applicable) is aware that this is a billable service. Verbal consent to proceed has been obtained within the past 12 months. The visit was conducted pursuant to the emergency declaration under the [facility], [phone] waiver authority and the [employer] and [employer] Act. Patient identification was verified, and a caregiver was present when appropriate. The patient was located in a state where the provider was credentialed to provide care. Total time spent for this encounter: Not billed by time    --Saskia Clayton DO on 12/11/2021 at 8:17 AM    An electronic signature was used to authenticate this note. All patient questions answered. Patient voiced understanding.

## 2021-12-14 ENCOUNTER — TELEPHONE (OUTPATIENT)
Dept: FAMILY MEDICINE CLINIC | Age: 66
End: 2021-12-14

## 2021-12-14 NOTE — TELEPHONE ENCOUNTER
----- Message from [facility],  sent at 12/11/2021  8:16 AM EST -----  Check on Katherin Serum

## 2021-12-14 NOTE — TELEPHONE ENCOUNTER
Spoke with Ash Alvarado, reports that pt is mildly improving. Breathing a little better. Is still using O2. Coughing is improved, but still there, moderately productive. Advised to continue current meds, let me know if any worsening sx.

## 2021-12-15 ENCOUNTER — TELEPHONE (OUTPATIENT)
Dept: FAMILY MEDICINE CLINIC | Age: 66
End: 2021-12-15

## 2021-12-15 NOTE — TELEPHONE ENCOUNTER
----- Message from Ana Maria Overton sent at 12/15/2021  8:23 AM EST -----  Subject: Results Request    QUESTIONS  Which lab or imaging result is the patient calling about? labs  Which provider ordered the test?   At what location was the test performed? Date the test was performed? 2021-12-07  Additional Information for Provider? Pt would like the results of his   blood work from 12/7  ---------------------------------------------------------------------------  --------------  [address]  What is the best way for the office to contact you? OK to leave message on   voicemail  Preferred Call Back Phone Number?  [phone]

## 2021-12-15 NOTE — TELEPHONE ENCOUNTER
Spoke with patient's fiance and they would like to know if the blood work that was done in the hospital shows if there was an infection in his blood. They said it would take 5 days to get the results and they have not heard anything.

## 2021-12-15 NOTE — TELEPHONE ENCOUNTER
He had quite a few labs drawn while in the hospital from 12/7-12/9. Is there a particular lab that he wants to know about?

## 2021-12-27 ENCOUNTER — TELEPHONE (OUTPATIENT)
Dept: FAMILY MEDICINE CLINIC | Age: 66
End: 2021-12-27

## 2021-12-27 NOTE — TELEPHONE ENCOUNTER
Most hospitals in this are are extremely limited in their ability to accept transfers and I can't request one for him.

## 2021-12-28 ENCOUNTER — TELEPHONE (OUTPATIENT)
Dept: FAMILY MEDICINE CLINIC | Age: 66
End: 2021-12-28

## 2021-12-28 DIAGNOSIS — I26.99 OTHER ACUTE PULMONARY EMBOLISM WITHOUT ACUTE COR PULMONALE (HCC): Primary | ICD-10-CM

## 2021-12-28 NOTE — TELEPHONE ENCOUNTER
----- Message from Nancie Salomon sent at 12/28/2021  2:55 PM EST -----  Subject: Referral Request    QUESTIONS   Reason for referral request? Pt is now on coumadin, Dr. Bobby Rock put him   on the medication at [facility]. Pt needs a referral to the coumadin   clinic. Has the physician seen you for this condition before? No   Preferred Specialist (if applicable)? Do you already have an appointment scheduled? No  Additional Information for Provider? Please call Jahaira Rushing at [facility] at [phone]  ---------------------------------------------------------------------------  --------------  [address]  What is the best way for the office to contact you? OK to leave message on   voicemail  Preferred Call Back Phone Number?  [phone]

## 2021-12-29 ENCOUNTER — TELEPHONE (OUTPATIENT)
Dept: FAMILY MEDICINE CLINIC | Age: 66
End: 2021-12-29

## 2021-12-29 ENCOUNTER — TELEPHONE (OUTPATIENT)
Dept: PHARMACY | Age: 66
End: 2021-12-29

## 2021-12-29 DIAGNOSIS — J44.9 CHRONIC OBSTRUCTIVE PULMONARY DISEASE, UNSPECIFIED COPD TYPE (HCC): ICD-10-CM

## 2021-12-29 NOTE — TELEPHONE ENCOUNTER
----- Message from [facility] sent at 12/29/2021  9:53 AM EST -----  Subject: Message to Provider    QUESTIONS  Information for Provider? possible yeast infection. appt on 1/4. please   call to advise on what to do  ---------------------------------------------------------------------------  --------------  [address]  What is the best way for the office to contact you? OK to leave message on   voicemail  Preferred Call Back Phone Number? [phone]  ---------------------------------------------------------------------------  --------------  SCRIPT ANSWERS  Relationship to Patient?  Third Party  Representative Name? Yady Villanueva

## 2021-12-29 NOTE — TELEPHONE ENCOUNTER
New referral to anticoagulation clinic from Dr. Cristiano Ochoa. New PE. INR target 2-3. Duration 6 months. Started warfarin 5 mg daily 12/28/21. Also received Rx for Lovenox but only enough (per Jennifer norris) to last him through 12/29/21. Needs new Rx but has no insurance and has to pay cash. Advised pt to:  1) continue warfarin 5 mg daily until first INR check. 2) scheduled INR apptmt for 1/3/22  3) Sent Rx for more Lovenox (quantity: #14) to [facility] outpatient pharmacy through [facility] and advised Daniela Whipple they can come pick it up today. (copy of printed Rx below and also sent electronically through Epic). ABW:  180 kg  Calculated CrCl:  0.64  Plt:  158  Lovenox dose:  180 mg q 12 hrs                                                                            [address]              Social Workers please take this voucher to [facility] on-site pharmacy         (Authorized to use at [employer] for after hours use only)    Eligible Client/Patient: Pau Page    YOB: 1955    Address: [unfilled]      Phone [phone] (home)     Drug Allergies: Allergies   Allergen Reactions    Prednisone Rash       Date of Service: 12/29/2021    Authorized Medications to fill (include quantities):    Lovenox 100 mg syringes   Qty: #14  Refills: Zero    Sig: inject 180 mg (90 mg from two 100 mg syringes) twice daily or as directed by [facility]. Please instruct pt how to inject only 90 mg from each of the 100 mg syringes for a total of 180 mg for each dose.            Authorization Signature:________________________________________________Phone:_______  Screening Process:   This authorization is for the authorized prescription(s) listed above only   Quantity Limitations - Per Rx and Drug Coverage is Per RX (not to exceed 30 days)   [facility] Plan ID:  [id number]   *NO REFILLS ALLOWED   Does the patient currently have insurance? YES  NO   (If yes please include card)  If the patient does not have insurance have they been screened for [facility]?  Yes No (if yes and eligible please contact Yaneth Harrington for assistance at ext. [phone])

## 2021-12-30 RX ORDER — ALBUTEROL SULFATE 90 UG/1
2 AEROSOL, METERED RESPIRATORY (INHALATION) EVERY 6 HOURS PRN
Qty: 1 EACH | Refills: 3 | Status: SHIPPED | OUTPATIENT
Start: 2021-12-30 | End: 2022-04-11

## 2021-12-30 RX ORDER — ALBUTEROL SULFATE 90 UG/1
2 AEROSOL, METERED RESPIRATORY (INHALATION) EVERY 6 HOURS PRN
Qty: 1 EACH | Refills: 3 | Status: SHIPPED | OUTPATIENT
Start: 2021-12-30 | End: 2021-12-30 | Stop reason: SDUPTHER

## 2021-12-30 NOTE — TELEPHONE ENCOUNTER
Spoke with patient's fiance and she has been using OTC medications and she said it is clearing up. She will update us if it gets worse.

## 2021-12-30 NOTE — TELEPHONE ENCOUNTER
Spoke with patient's jr and [address] [place] [address] told her they have until the 31st to get assistance with the inhalers. A prescription needs to be faxed to [phone].

## 2022-01-04 ENCOUNTER — HOSPITAL ENCOUNTER (OUTPATIENT)
Dept: PHARMACY | Age: 67
Setting detail: THERAPIES SERIES
Discharge: HOME | End: 2022-01-04
Payer: MEDICARE

## 2022-01-04 ENCOUNTER — TELEMEDICINE (OUTPATIENT)
Dept: FAMILY MEDICINE CLINIC | Age: 67
End: 2022-01-04
Payer: MEDICARE

## 2022-01-04 VITALS — WEIGHT: 315 LBS | BODY MASS INDEX: 51.24 KG/M2

## 2022-01-04 DIAGNOSIS — J44.9 CHRONIC OBSTRUCTIVE PULMONARY DISEASE, UNSPECIFIED COPD TYPE (HCC): ICD-10-CM

## 2022-01-04 DIAGNOSIS — I26.99 PULMONARY EMBOLISM, UNSPECIFIED CHRONICITY, UNSPECIFIED PULMONARY EMBOLISM TYPE, UNSPECIFIED WHETHER ACUTE COR PULMONALE PRESENT (HCC): Primary | ICD-10-CM

## 2022-01-04 DIAGNOSIS — Z51.81 ENCOUNTER FOR THERAPEUTIC DRUG MONITORING: ICD-10-CM

## 2022-01-04 DIAGNOSIS — I82.409 DEEP VEIN THROMBOSIS (DVT) OF LOWER EXTREMITY, UNSPECIFIED CHRONICITY, UNSPECIFIED LATERALITY, UNSPECIFIED VEIN (HCC): ICD-10-CM

## 2022-01-04 DIAGNOSIS — Z79.01 ANTICOAGULATED ON COUMADIN: ICD-10-CM

## 2022-01-04 DIAGNOSIS — I26.99 OTHER ACUTE PULMONARY EMBOLISM WITHOUT ACUTE COR PULMONALE (HCC): Primary | ICD-10-CM

## 2022-01-04 DIAGNOSIS — U07.1 2019 NOVEL CORONAVIRUS DISEASE (COVID-19): ICD-10-CM

## 2022-01-04 LAB — POC INR: 1.2 (ref 0.8–1.2)

## 2022-01-04 PROCEDURE — 3023F SPIROM DOC REV: CPT | Performed by: FAMILY MEDICINE

## 2022-01-04 PROCEDURE — 99213 OFFICE O/P EST LOW 20 MIN: CPT | Performed by: PHARMACIST

## 2022-01-04 PROCEDURE — 99214 OFFICE O/P EST MOD 30 MIN: CPT | Performed by: FAMILY MEDICINE

## 2022-01-04 PROCEDURE — 85610 PROTHROMBIN TIME: CPT | Performed by: PHARMACIST

## 2022-01-04 PROCEDURE — 36416 COLLJ CAPILLARY BLOOD SPEC: CPT | Performed by: PHARMACIST

## 2022-01-04 PROCEDURE — 1111F DSCHRG MED/CURRENT MED MERGE: CPT | Performed by: FAMILY MEDICINE

## 2022-01-04 PROCEDURE — G8484 FLU IMMUNIZE NO ADMIN: HCPCS | Performed by: FAMILY MEDICINE

## 2022-01-04 PROCEDURE — 1036F TOBACCO NON-USER: CPT | Performed by: FAMILY MEDICINE

## 2022-01-04 PROCEDURE — 3017F COLORECTAL CA SCREEN DOC REV: CPT | Performed by: FAMILY MEDICINE

## 2022-01-04 PROCEDURE — G8427 DOCREV CUR MEDS BY ELIG CLIN: HCPCS | Performed by: FAMILY MEDICINE

## 2022-01-04 PROCEDURE — G8421 BMI NOT CALCULATED: HCPCS | Performed by: FAMILY MEDICINE

## 2022-01-04 RX ORDER — WARFARIN SODIUM 5 MG/1
5 TABLET ORAL DAILY
Qty: 30 TABLET | Refills: 3
Start: 2022-01-04 | End: 2022-01-21 | Stop reason: SDUPTHER

## 2022-01-04 RX ORDER — ACETAMINOPHEN, ASPIRIN AND CAFFEINE 250; 250; 65 MG/1; MG/1; MG/1
1 TABLET, FILM COATED ORAL EVERY 6 HOURS PRN
COMMUNITY

## 2022-01-04 RX ORDER — M-VIT,TX,IRON,MINS/CALC/FOLIC 27MG-0.4MG
1 TABLET ORAL DAILY
COMMUNITY
End: 2022-01-07

## 2022-01-04 RX ORDER — PREGABALIN 100 MG/1
100 CAPSULE ORAL 3 TIMES DAILY
COMMUNITY

## 2022-01-04 RX ORDER — MULTIVIT WITH MINERALS/LUTEIN
250 TABLET ORAL DAILY
COMMUNITY
End: 2022-01-07

## 2022-01-04 RX ORDER — CHOLECALCIFEROL (VITAMIN D3) 125 MCG
500 CAPSULE ORAL DAILY
COMMUNITY

## 2022-01-04 RX ORDER — ZOLPIDEM TARTRATE 10 MG/1
10 TABLET ORAL NIGHTLY PRN
COMMUNITY

## 2022-01-04 NOTE — PROGRESS NOTES
Medication Management [address] [facility] [address]  [address] [place] [address]  [phone] (phone)  [phone] (fax)     Patient presents to the Anticoagulation clinic today for assessment and initial dosing of warfarin. Patient has a diagnosis of PE and has been placed on Coumadin with a goal INR 2-3. Pt started Coumadin 5mg daily 12/26/21, also on Lovenox currently. Ingris Taylor was given full education today in the clinic including written materials, supplemental oral instructions, and all questions were answered. Specifically, Sinai Molina was instructed to notfiy the Anticoagulation clinic immediately if there is any unusual bleeding, such as throwing or coughing up blood, bleeding from the nose, mouth, ears, or pink-red tinge in the urine or if the stools contain bright red blood or are black and tarry. In addition, Sinai Molina was informed to notify the clinic about any and all medicine changes, including prescriptions, over-the-counter or nonprescription medicines, vitamins, herbs, supplements, creams, rubs, eye or ear drops, and injections, whether to be used short- or long-term, within 24 hours. The patient was also instructed to let all other physicians and pharmacists know that warfarin was started as a double-check against drug interactions. The patient was further instructed on the effects of vitamin K containing foods on warfarin and the importance of consistency was stressed. Sinai Molina was also advised to avoid large amounts of alcohol, grapefruit juice or cranberry juice while on warfarin. HPI:    Medication changes: updated list  Tablet strength per patient: 5mg  Patient reported dosing regimen over last 1 week: none. Doses given at [facility] updated on track board. Missed doses in the last 1-2 weeks: none   Extra doses in the last 1-2 weeks: none  Any problems with bleeding/bruising? No  Any recent falls? No   Any signs or symptoms of DVT/PE or stroke?  No  Alcohol use: plans to avoid    Tobacco use: none  Diet changes as follows: No changes   Green leafy intake: likes these things   Grapefruit/cranberry juice: discussed avoiding  Upcoming surgeries or procedures: none planned  Currently bridging with Lovenox, has 12 boxes of 10 syringes at home. Received Rx for Lovenox 160mg BID from [facility], pt states he weighs 352 lbs. Assessment  Lab Results   Component Value Date    INR 1.20 01/04/2022    INR 1.08 01/15/2020    PROTIME 12.4 01/15/2020     INR subtherapeutic   Recent Labs     01/04/22  1453   INR 1.20     Acute PE and DVT s/p COVID    352 lbs. Has Lovenox 80mg syringes at home     Plan:  Continue Lovenox 160mg SQ Q12h. Coumadin 10mg today and tomorrow, 7.5mg on 1/6/22. Recheck INR 3 days. Patient reminded to call the Anticoagulation Clinic with any signs or symptoms of bleeding or with any medication changes. Patient given instructions utilizing the teach back method.  Printed patient teaching/instruction included with AVS.       For Pharmacy Admin Tracking Only     Intervention Detail: Dose Adjustment: 1, reason: Therapy Optimization   Total # of Interventions Recommended: 1   Total # of Interventions Accepted: 1   Time Spent (min): 60

## 2022-01-04 NOTE — PROGRESS NOTES
Transition of Care Visit/Hospital Follow Up:      Prudence Carver is a 61 y.o. male that presents for Pulmonary Embolism        Date of Discharge:   12/28/21  Was patient contacted within 2 business days of discharge (see chart for documentation):  no      Patient presents for hospital follow up. Patient recently hospitalized at [facility] for treatment of PE, COVID. Symptoms prior to admission:  SOB     Hospital Course per last provider progress note:    Assessment/Plan:    Impression and Recommendations     1) COVID-19 pneumonia  - Tested positive 12/7, off special isolation 12/27  - Continue Decadron (day 4) and Baricitinib (day 4) as inpatient, no need on discharge    2) Bilateral pulmonary emboli  - Continue Lovenox bridge to Coumadin, will need 3-6 months tx    3) Acute hypoxemic respiratory failure  - Secondary to #1 and #2 above, now down to 1L, wean to off for any SpO2 > 88%  - Does still have oxygen at home from his discharge last week    Wean oxygen to off as able, though he still does have home O2 left from his discharge last week. No need to continue Decadron and Bariticinib after discharge, but would continue as long as he remains inpatient. Coumadin for 3-6 months for his PE. Medication changes at discharge:  Med list reconciled today    Clinical course since discharge:  'My breathing is about the same'. Still notes some discomfort with deep breathing. Trying to use spirometer. Is using O2, mostly at night. Has a dry cough. Still on the lovenox, has appt with coumadin clinic today. Currently on 5mg daily. Eating well, he is trying to intentionally lose weight.         Current Outpatient Medications   Medication Sig Dispense Refill    warfarin (COUMADIN) 5 MG tablet Take 1 tablet by mouth daily 30 tablet 3    albuterol sulfate HFA (VENTOLIN HFA) 108 (90 Base) MCG/ACT inhaler Inhale 2 puffs into the lungs every 6 hours as needed for Wheezing 1 each 3    enoxaparin (LOVENOX) 100 MG/ML injection Inject 180 mg (90 mg from each of the 100 mg syringes) into skin in abdomen twice daily as instructed by [facility] medication management clinic. 14 each 0    traZODone (DESYREL) 100 MG tablet Take 1 tablet by mouth nightly 30 tablet 3    buPROPion (WELLBUTRIN XL) 300 MG extended release tablet Take 1 tablet by mouth every morning 30 tablet 3    amitriptyline (ELAVIL) 50 MG tablet Take 1 tablet by mouth nightly 30 tablet 3    glipiZIDE (GLUCOTROL) 10 MG tablet take 1 tablet by mouth twice a day 180 tablet 3    fluticasone (FLONASE) 50 MCG/ACT nasal spray 2 sprays by Nasal route daily 1 Bottle 0    ipratropium-albuterol (DUONEB) 0.5-2.5 (3) MG/3ML SOLN nebulizer solution Inhale 3 mLs into the lungs every 4 hours as needed for Shortness of Breath 360 mL 2    bumetanide (BUMEX) 1 MG tablet Take 2 tabs PO q AM and take 1 tabs PO qhs 90 tablet 5    metFORMIN (GLUCOPHAGE) 1000 MG tablet 1 tab PO daily 60 tablet 5    lisinopril (PRINIVIL;ZESTRIL) 10 MG tablet Take 1 tablet by mouth daily 90 tablet 3    pregabalin (LYRICA) 50 MG capsule Take 75 mg by mouth 3 times daily.  aspirin 81 MG tablet Take 81 mg by mouth daily      Tens Unit MISC by Does not apply route      Handicap Placard MISC by Does not apply route Expires 2022 1 each 0     No current facility-administered medications for this visit.        Past Medical History:   Diagnosis Date    Asthma     CHF (congestive heart failure) (HCC)     Chronic back pain     Chronic neck pain     Obesity        Past Surgical History:   Procedure Laterality Date    COLONOSCOPY  4/17/15    OTHER SURGICAL HISTORY      Had face surgery at [address] [place] [address] Left     Dr. Wills Fee History     Tobacco Use    Smoking status: Former Smoker     Packs/day: 0.50     Years: 40.00     Pack years: 20.00     Types: Cigarettes     Start date: 6/10/1974     Quit date: 3/20/2017     Years since quittin.7    Smokeless tobacco: Never Used    Tobacco comment: information given at past appts   Substance Use Topics    Alcohol use: Yes     Comment: very little    Drug use: No       Family History   Problem Relation Age of Onset    Arthritis Father     Stroke Father     Heart Attack Father          I have reviewed the patient's past medical history, past surgical history, allergies, medications, social and family history and I have made updates where appropriate. PHYSICAL EXAM:  There were no vitals taken for this visit. Physical Exam  Nursing note reviewed. Constitutional:       Appearance: He is well-developed. Pulmonary:      Effort: Pulmonary effort is normal. No respiratory distress. Neurological:      Mental Status: He is alert. Psychiatric:         Behavior: Behavior normal.         Thought Content: Thought content normal.         Judgment: Judgment normal.           Diagnostic test results reviewed: inpatient labs    Patient risk of morbidity and mortality: high      ASSESSMENT & PLAN  Leonora Landeros was seen today for pulmonary embolism. Diagnoses and all orders for this visit:    Other acute pulmonary embolism without acute cor pulmonale (HCC)  -     warfarin (COUMADIN) 5 MG tablet; Take 1 tablet by mouth daily    Chronic obstructive pulmonary disease, unspecified COPD type ([facility] [address]  -     [facility] Referral to [facility] Clinical Specialist    2019 novel coronavirus disease (COVID-19)        Return if symptoms worsen or fail to improve.     Level of medical complexity:  moderate    -Overall, patient is improving  -Continue coumadin and lovenox, will follow with coumadin clinic recommendations.  -Continue current meds  -Advised to continue to closely monitor her symptoms and if any worsening to seek treatment    All copied or forwarded information in the progress note was verified by me to be accurate at the time of visit  Patient's past medical, surgical, social and family history were reviewed and updated All patient questions answered. Patient voiced understanding. Gianluca Mar, was evaluated through a synchronous (real-time) audio-video encounter. The patient (or guardian if applicable) is aware that this is a billable service. Verbal consent to proceed has been obtained within the past 12 months. The visit was conducted pursuant to the emergency declaration under the [address] [place] [address] and the [employer] General Act. Patient identification was verified, and a caregiver was present when appropriate. The patient was located in a state where the provider was credentialed to provide care. Total time spent for this encounter: Not billed by time    --Bossman Savage DO on 1/4/2022 at 10:12 AM    An electronic signature was used to authenticate this note.

## 2022-01-07 ENCOUNTER — HOSPITAL ENCOUNTER (OUTPATIENT)
Dept: PHARMACY | Age: 67
Setting detail: THERAPIES SERIES
Discharge: HOME | End: 2022-01-07
Payer: MEDICARE

## 2022-01-07 DIAGNOSIS — Z79.01 ANTICOAGULATED ON COUMADIN: ICD-10-CM

## 2022-01-07 DIAGNOSIS — I82.4Z9 DEEP VEIN THROMBOSIS (DVT) OF DISTAL VEIN OF LOWER EXTREMITY, UNSPECIFIED CHRONICITY, UNSPECIFIED LATERALITY (HCC): ICD-10-CM

## 2022-01-07 DIAGNOSIS — Z51.81 ENCOUNTER FOR THERAPEUTIC DRUG MONITORING: ICD-10-CM

## 2022-01-07 DIAGNOSIS — I26.99 PULMONARY EMBOLISM, OTHER, UNSPECIFIED CHRONICITY, UNSPECIFIED WHETHER ACUTE COR PULMONALE PRESENT (HCC): Primary | ICD-10-CM

## 2022-01-07 LAB — POC INR: 1.4 (ref 0.8–1.2)

## 2022-01-07 PROCEDURE — 36416 COLLJ CAPILLARY BLOOD SPEC: CPT | Performed by: PHARMACIST

## 2022-01-07 PROCEDURE — 99212 OFFICE O/P EST SF 10 MIN: CPT | Performed by: PHARMACIST

## 2022-01-07 PROCEDURE — 85610 PROTHROMBIN TIME: CPT | Performed by: PHARMACIST

## 2022-01-07 RX ORDER — MULTIVIT-MIN/IRON/FOLIC ACID/K 18-600-40
500 CAPSULE ORAL DAILY
COMMUNITY

## 2022-01-07 RX ORDER — VITAMIN E 268 MG
400 CAPSULE ORAL DAILY
COMMUNITY

## 2022-01-07 NOTE — PROGRESS NOTES
Medication Management [address]  [phone] (phone)  [phone] (fax)    Mr. Elias Bains is a 61 y.o.  male with history of DVT, PE who presents today for anticoagulation monitoring and adjustment. Patient verifies current dosing regimen and tablet strength. No missed or extra doses. Patient denies s/s bleeding/bruising/swelling/SOB - still has chest pain when he sits up, goes away when he lays down, says this was happening during hospital stay, has been consistent, might be getting better. No blood in urine or stool. No dietary changes. No changes in medication/OTC agents/Herbals. No change in alcohol use or tobacco use. No change in activity level. Patient denies headaches/dizziness/lightheadedness/falls. No vomiting/diarrhea or acute illness. No Procedures scheduled in the future at this time. Currently bridging with Lovenox. Pt states that he has plenty of Lovenox left at home. Assessment:   Lab Results   Component Value Date    INR 1.40 (H) 01/07/2022    INR 1.20 01/04/2022    INR 1.08 01/15/2020    PROTIME 12.4 01/15/2020     INR subtherapeutic   Recent Labs     01/07/22  0935   INR 1.40*       Plan:  Continue Lovenox 160mg SQ Q12h. Coumadin 12.5mg today, 10mg 1/8 and 1/9. Recheck INR in 3 day(s). Patient reminded to call the Anticoagulation Clinic with any signs or symptoms of bleeding or with any medication changes. Patient given instructions utilizing the teach back method. After visit summary printed and reviewed with patient. Discharged in [facility] in no apparent distress.     For Pharmacy Admin Tracking Only     Intervention Detail: Dose Adjustment: 1, reason: Therapy Optimization   Total # of Interventions Recommended: 1   Total # of Interventions Accepted: 1   Time Spent (min): 20

## 2022-01-10 ENCOUNTER — HOSPITAL ENCOUNTER (OUTPATIENT)
Dept: PHARMACY | Age: 67
Setting detail: THERAPIES SERIES
Discharge: HOME | End: 2022-01-10
Payer: MEDICARE

## 2022-01-10 DIAGNOSIS — Z51.81 ENCOUNTER FOR THERAPEUTIC DRUG MONITORING: ICD-10-CM

## 2022-01-10 DIAGNOSIS — I26.99 PULMONARY EMBOLISM, UNSPECIFIED CHRONICITY, UNSPECIFIED PULMONARY EMBOLISM TYPE, UNSPECIFIED WHETHER ACUTE COR PULMONALE PRESENT (HCC): Primary | ICD-10-CM

## 2022-01-10 DIAGNOSIS — I82.409 DEEP VEIN THROMBOSIS (DVT) OF LOWER EXTREMITY, UNSPECIFIED CHRONICITY, UNSPECIFIED LATERALITY, UNSPECIFIED VEIN (HCC): ICD-10-CM

## 2022-01-10 DIAGNOSIS — Z79.01 ANTICOAGULATED ON COUMADIN: ICD-10-CM

## 2022-01-10 LAB — POC INR: 1.6 (ref 0.8–1.2)

## 2022-01-10 PROCEDURE — 85610 PROTHROMBIN TIME: CPT

## 2022-01-10 PROCEDURE — 99212 OFFICE O/P EST SF 10 MIN: CPT

## 2022-01-10 PROCEDURE — 36416 COLLJ CAPILLARY BLOOD SPEC: CPT

## 2022-01-10 NOTE — PROGRESS NOTES
Medication Management [address]  [phone] (phone)  [phone] (fax)    Mr. Earlene Puentes is a 61 y.o.  male with history of DVT, PE who presents today for anticoagulation monitoring and adjustment. Patient verifies current dosing regimen and tablet strength. No missed or extra doses. Patient denies s/s bleeding/swelling. Patient reports he has bruising around his stomach from Lovenox injections. Patient has been rotating sites and states that bruises are improving. Patient has some shortness of breath and chest pain associated with current blood clots in lungs. No blood in urine or stool. No dietary changes. No changes in medication/OTC agents/Herbals. Patient still has plenty of Lovenox injections left at home. No change in alcohol use or tobacco use. No change in activity level. Patient denies headaches/lightheadedness/falls. Patient reports he is sometimes dizzy when standing up. Discussed the importance of being cautious and taking his time when standing to ensure he does not fall or hit his head. Patient voiced understanding. No vomiting/diarrhea or acute illness. No Procedures scheduled in the future at this time. Assessment:   Lab Results   Component Value Date    INR 1.60 (H) 01/10/2022    INR 1.40 (H) 01/07/2022    INR 1.20 01/04/2022    PROTIME 12.4 01/15/2020     INR subtherapeutic   Recent Labs     01/10/22  1018   INR 1.60*     Patient remains subtherapeutic. Still trying to establish maintenance dose of Coumadin. Plan:  Continue Lovenox 160 mg Q12H. Take Coumadin 12.5 mg x 3 doses today 1/10, tomorrow 1/11, and Wednesday 1/12, then take Coumadin 10 mg on Thursday 1/13. Recheck INR in 4 day(s) on 1/14. Discussed plan with Rima Grajeda Pharmacist.   Patient reminded to call the Anticoagulation Clinic with signs or symptoms of bleeding or with any medication changes.  Patient given instructions utilizing the teach back method. After visit summary printed and reviewed with patient. Discharged ambulatory in no apparent distress.     For Pharmacy Admin Tracking Only     Intervention Detail: Dose Adjustment: 1, reason: Therapy Optimization   Total # of Interventions Recommended: 1   Total # of Interventions Accepted: 1   Time Spent (min): [address] Asif PharmD   1/10/2022, 10:39 AM

## 2022-01-11 ENCOUNTER — TELEPHONE (OUTPATIENT)
Dept: FAMILY MEDICINE CLINIC | Age: 67
End: 2022-01-11

## 2022-01-11 ENCOUNTER — CLINICAL DOCUMENTATION (OUTPATIENT)
Dept: SPIRITUAL SERVICES | Facility: CLINIC | Age: 67
End: 2022-01-11

## 2022-01-11 NOTE — ACP (ADVANCE CARE PLANNING)
Advance Care Planning   Ambulatory ACP Specialist Patient Outreach    Date:  1/11/2022  ACP Specialist:  Shamar Harvey    Outreach call to patient in follow-up to ACP Specialist referral from: Justin Moyer DO    [x] PCP  [] Provider   [] Ambulatory Care Management [] Other for Reason:    [x] Advance Directive Assistance  [] Code Status Discussion  [] Complete Portable DNR Order  [x] Discuss Goals of Care  [] Complete POST/MOST  [] Early ACP Decision-Making  [] Other    Date Referral Received: 1/4/2022    Today's Outreach:  [x] First   [] Second  [] Third                               Third outreach made by []  phone  [] email []   [employer]     Intervention:  [] Spoke with Patient  [x] Left VM requesting return call      Outcome:    [address] made an initial attempt to contact  Trev" via telephone call to offer support, if desired, for the completion of Advance Directives documents as part of an [address] conversation. * No answer. Left message. *  will follow-up. Next Step:   [] ACP scheduled conversation  [x] Outreach again in one week               [] Email / Mail ACP Info Sheets  [] Email / Mail Advance Directive            [] Close Referral. Routing closure to referring provider/staff and to ACP Specialist .      Thank you for this referral.

## 2022-01-11 NOTE — TELEPHONE ENCOUNTER
----- Message from Garry Oscar DO sent at 1/11/2022 11:22 AM EST -----  Please contact patient and see how his symptoms are doing recently.  ----- Message -----  From: Garry Oscar DO  Sent: 1/11/2022  12:00 AM EST  To: Garry Oscar DO    Check on condition

## 2022-01-14 ENCOUNTER — APPOINTMENT (OUTPATIENT)
Dept: PHARMACY | Age: 67
End: 2022-01-14
Payer: MEDICARE

## 2022-01-14 ENCOUNTER — TELEPHONE (OUTPATIENT)
Dept: PHARMACY | Age: 67
End: 2022-01-14

## 2022-01-14 NOTE — TELEPHONE ENCOUNTER
Melanie Rhodes called in to reschedule his appointment for today - reports that he has severe back pain and can't make it in. After risk/benefit discussion, rescheduled visit for Monday, 1/17. Instructed Melanie Rhodes to take warfarin 12.5mg today, 10mg on 1/15, and 10mg on 1/16. He will continue Lovenox injections - 160mg Q12h. He reports that he has plenty of syringes at home.     For Pharmacy Admin Tracking Only   Time Spent (min): 10        Jocy (Monica Boggs) Katarina Morrison, PharmD, BCPS

## 2022-01-17 ENCOUNTER — HOSPITAL ENCOUNTER (OUTPATIENT)
Dept: PHARMACY | Age: 67
Setting detail: THERAPIES SERIES
Discharge: HOME | End: 2022-01-17
Payer: MEDICARE

## 2022-01-17 DIAGNOSIS — Z51.81 ENCOUNTER FOR THERAPEUTIC DRUG MONITORING: ICD-10-CM

## 2022-01-17 DIAGNOSIS — Z79.01 ANTICOAGULATED ON COUMADIN: Primary | ICD-10-CM

## 2022-01-17 LAB — POC INR: 2.5 (ref 0.8–1.2)

## 2022-01-17 PROCEDURE — 36416 COLLJ CAPILLARY BLOOD SPEC: CPT

## 2022-01-17 PROCEDURE — 85610 PROTHROMBIN TIME: CPT

## 2022-01-17 PROCEDURE — 99212 OFFICE O/P EST SF 10 MIN: CPT

## 2022-01-17 NOTE — PROGRESS NOTES
Medication Management [address]   [phone] (phone)  [phone] (fax)    Mr. Felicia Mata is a 61 y.o.  male with history of DVT, PE who presents today for anticoagulation monitoring and adjustment. Patient verifies current dosing regimen and tablet strength. No missed or extra doses. Patient denies s/s bleeding/swelling/SOB. Patient states he has bruising from Lovenox. He states his chest pain/discomfort is unchanged. No blood in urine or stool. Patient is eating healthier. No changes in medication/OTC agents/Herbals. No change in alcohol use or tobacco use. No change in activity level. Patient denies headaches/falls. Patient gets dizzy/lightheaded if he gets up too fast.   No vomiting/diarrhea or acute illness. No Procedures scheduled in the future at this time. Assessment:   Lab Results   Component Value Date    INR 2.50 (H) 01/17/2022    INR 1.60 (H) 01/10/2022    INR 1.40 (H) 01/07/2022    PROTIME 12.4 01/15/2020     INR therapeutic   Recent Labs     01/17/22  0945   INR 2.50*     Patient is newer to Coumadin and is therapeutic today. Offered 1 week INR check, but patient prefers INR check Friday 1/21/22. Plan:  Stop Lovenox. Alternate Coumadin 10 mg and 12.5 mg doses. Recheck INR in 4 day(s) per patient request. Patient reminded to call the Anticoagulation Clinic with any signs or symptoms of bleeding or with any medication changes. Patient given instructions utilizing the teach back method. After visit summary printed and reviewed with patient. Discharged via wheelchair in no apparent distress.     For Pharmacy Admin Tracking Only     Intervention Detail: Dose Adjustment: 1, reason: Therapy De-escalation   Total # of Interventions Recommended: 1   Total # of Interventions Accepted: 1   Time Spent (min): [address], PharmD, BCPS  1/17/2022  12:08 PM

## 2022-01-21 ENCOUNTER — HOSPITAL ENCOUNTER (OUTPATIENT)
Dept: PHARMACY | Age: 67
Setting detail: THERAPIES SERIES
Discharge: HOME | End: 2022-01-21
Payer: MEDICARE

## 2022-01-21 DIAGNOSIS — I26.99 PULMONARY EMBOLISM, OTHER, UNSPECIFIED CHRONICITY, UNSPECIFIED WHETHER ACUTE COR PULMONALE PRESENT (HCC): Primary | ICD-10-CM

## 2022-01-21 DIAGNOSIS — Z79.01 ANTICOAGULATED ON COUMADIN: ICD-10-CM

## 2022-01-21 DIAGNOSIS — I26.99 OTHER ACUTE PULMONARY EMBOLISM WITHOUT ACUTE COR PULMONALE (HCC): ICD-10-CM

## 2022-01-21 DIAGNOSIS — I82.499 DEEP VEIN THROMBOSIS (DVT) OF OTHER VEIN OF LOWER EXTREMITY, UNSPECIFIED CHRONICITY, UNSPECIFIED LATERALITY (HCC): ICD-10-CM

## 2022-01-21 DIAGNOSIS — Z51.81 ENCOUNTER FOR THERAPEUTIC DRUG MONITORING: ICD-10-CM

## 2022-01-21 LAB — POC INR: 1.9 (ref 0.8–1.2)

## 2022-01-21 PROCEDURE — 36416 COLLJ CAPILLARY BLOOD SPEC: CPT | Performed by: PHARMACIST

## 2022-01-21 PROCEDURE — 99212 OFFICE O/P EST SF 10 MIN: CPT | Performed by: PHARMACIST

## 2022-01-21 PROCEDURE — 85610 PROTHROMBIN TIME: CPT | Performed by: PHARMACIST

## 2022-01-21 RX ORDER — WARFARIN SODIUM 5 MG/1
TABLET ORAL
Qty: 90 TABLET | Refills: 3 | Status: SHIPPED | OUTPATIENT
Start: 2022-01-21 | End: 2022-04-07

## 2022-01-21 NOTE — PROGRESS NOTES
Medication Management [address]  [phone] (phone)  [phone] (fax)    Mr. Elias Bains is a 61 y.o.  male with history of DVT, PE who presents today for anticoagulation monitoring and adjustment. Patient verifies current dosing regimen and tablet strength. No missed or extra doses. Patient denies s/s bleeding/bruising/swelling/SOB/chest pain  No blood in urine or stool. No dietary changes. No changes in medication/OTC agents/Herbals. No change in alcohol use or tobacco use. No change in activity level. Patient denies headaches//falls. Patient gets dizzy/lightheaded if he gets up too fast, typical for pt. No vomiting/diarrhea or acute illness. No Procedures scheduled in the future at this time. Assessment:   Lab Results   Component Value Date    INR 1.90 (H) 01/21/2022    INR 2.50 (H) 01/17/2022    INR 1.60 (H) 01/10/2022    PROTIME 12.4 01/15/2020     INR subtherapeutic   Recent Labs     01/21/22  0937   INR 1.90*         Plan:  Coumadin 12.5mg today, then increase Coumadin 10mg MWF and 12.5mg TThSaS. Recheck INR in 1 week(s). Refill sent to pharmacy. Patient reminded to call the Anticoagulation Clinic with signs or symptoms of bleeding or with any medication changes. Patient given instructions utilizing the teach back method. After visit summary printed and reviewed with patient. Discharged in [facility] in no apparent distress.       For Pharmacy Admin Tracking Only     Intervention Detail: Dose Adjustment: 1, reason: Therapy Optimization and Refill(s) Provided   Total # of Interventions Recommended: 2   Total # of Interventions Accepted: 2   Time Spent (min): 20

## 2022-01-28 ENCOUNTER — HOSPITAL ENCOUNTER (OUTPATIENT)
Dept: PHARMACY | Age: 67
Setting detail: THERAPIES SERIES
Discharge: HOME | End: 2022-01-28
Payer: MEDICARE

## 2022-01-28 DIAGNOSIS — Z51.81 ENCOUNTER FOR THERAPEUTIC DRUG MONITORING: ICD-10-CM

## 2022-01-28 DIAGNOSIS — I26.99 PULMONARY EMBOLISM, OTHER, UNSPECIFIED CHRONICITY, UNSPECIFIED WHETHER ACUTE COR PULMONALE PRESENT (HCC): Primary | ICD-10-CM

## 2022-01-28 DIAGNOSIS — I82.409 DEEP VEIN THROMBOSIS (DVT) OF LOWER EXTREMITY, UNSPECIFIED CHRONICITY, UNSPECIFIED LATERALITY, UNSPECIFIED VEIN (HCC): ICD-10-CM

## 2022-01-28 DIAGNOSIS — Z79.01 ANTICOAGULATED ON COUMADIN: ICD-10-CM

## 2022-01-28 LAB — POC INR: 2.7 (ref 0.8–1.2)

## 2022-01-28 PROCEDURE — 36416 COLLJ CAPILLARY BLOOD SPEC: CPT | Performed by: PHARMACIST

## 2022-01-28 PROCEDURE — 85610 PROTHROMBIN TIME: CPT | Performed by: PHARMACIST

## 2022-01-28 PROCEDURE — 99211 OFF/OP EST MAY X REQ PHY/QHP: CPT | Performed by: PHARMACIST

## 2022-01-28 NOTE — PROGRESS NOTES
Medication Management [address]  [phone] (phone)  [phone] (fax)    Mr. Elis Barnhart is a 61 y.o.  male with history of DVT, PE who presents today for anticoagulation monitoring and adjustment. Patient verifies current dosing regimen and tablet strength. No missed or extra doses. Patient denies s/s bleeding/bruising/swelling/chest pain Still some SOB, but states it is continually improving. Advised pt to discuss with PCP. No blood in urine or stool. No dietary changes. No changes in medication/OTC agents/Herbals. No change in alcohol use or tobacco use. No change in activity level. Patient denies headaches/lightheadedness/falls. Still some dizziness when he stands up to fast.  Advised pt to discuss with PCP. No vomiting/diarrhea or acute illness. No Procedures scheduled in the future at this time. Assessment:   Lab Results   Component Value Date    INR 2.70 (H) 01/28/2022    INR 1.90 (H) 01/21/2022    INR 2.50 (H) 01/17/2022    PROTIME 12.4 01/15/2020     INR therapeutic   Recent Labs     01/28/22  1010   INR 2.70*         Plan:  Continue Coumadin 10mg MWF and 12.5mg TThSaS. Recheck INR in 1 week(s). Patient reminded to call the Anticoagulation Clinic with any signs or symptoms of bleeding or with any medication changes. Patient given instructions utilizing the teach back method. After visit summary printed and reviewed with patient. Discharged in [facility] in no apparent distress.     For Pharmacy Admin Tracking Only     Time Spent (min): 20

## 2022-02-04 ENCOUNTER — APPOINTMENT (OUTPATIENT)
Dept: PHARMACY | Age: 67
End: 2022-02-04
Payer: MEDICARE

## 2022-02-09 ENCOUNTER — CLINICAL DOCUMENTATION (OUTPATIENT)
Dept: SPIRITUAL SERVICES | Facility: CLINIC | Age: 67
End: 2022-02-09

## 2022-02-09 NOTE — ACP (ADVANCE CARE PLANNING)
Advance Care Planning   Ambulatory ACP Specialist Patient Outreach    Date:  2/9/2022  ACP Specialist:  Patricia Garland    Outreach call to patient in follow-up to ACP Specialist referral from: Pilo Kumari DO    [x] PCP  [] Provider   [] Ambulatory Care Management [] Other for Reason:    [x] Advance Directive Assistance  [] Code Status Discussion  [] Complete Portable DNR Order  [x] Discuss Goals of Care  [] Complete POST/MOST  [] Early ACP Decision-Making  [] Other    Date Referral Received: 1/4/2022    Today's Outreach:  [] First   [x] Second  [] Third                               Third outreach made by []  phone  [] email []   [employer]     Intervention:  [] Spoke with Patient  [x] Left VM requesting return call      Outcome:   Joana Glenis made a 2nd attempt to contact Loly Leon" via telephone call to offer support, if desired, for the completion of Advance Directives documents as part of an [address] conversation. * No answer. Left message. *  will follow-up. Next Step:   [] ACP scheduled conversation  [x] Outreach again in one week               [] Email / Mail ACP Info Sheets  [] Email / Mail Advance Directive            [] Close Referral. Routing closure to referring provider/staff and to ACP Specialist .      Thank you for this referral.

## 2022-02-11 ENCOUNTER — HOSPITAL ENCOUNTER (OUTPATIENT)
Dept: PHARMACY | Age: 67
Setting detail: THERAPIES SERIES
Discharge: HOME | End: 2022-02-11
Payer: MEDICARE

## 2022-02-11 DIAGNOSIS — Z79.01 ANTICOAGULATED ON COUMADIN: ICD-10-CM

## 2022-02-11 DIAGNOSIS — I26.99 PULMONARY EMBOLISM, UNSPECIFIED CHRONICITY, UNSPECIFIED PULMONARY EMBOLISM TYPE, UNSPECIFIED WHETHER ACUTE COR PULMONALE PRESENT (HCC): Primary | ICD-10-CM

## 2022-02-11 DIAGNOSIS — Z51.81 ENCOUNTER FOR THERAPEUTIC DRUG MONITORING: ICD-10-CM

## 2022-02-11 LAB — POC INR: 2.7 (ref 0.8–1.2)

## 2022-02-11 PROCEDURE — 85610 PROTHROMBIN TIME: CPT

## 2022-02-11 PROCEDURE — 36416 COLLJ CAPILLARY BLOOD SPEC: CPT

## 2022-02-11 PROCEDURE — 99211 OFF/OP EST MAY X REQ PHY/QHP: CPT

## 2022-02-11 NOTE — PROGRESS NOTES
Medication Management [address]   [phone] (phone)  [phone] (fax)    Mr. Maggie Calzada is a 61 y.o.  male with history of DVT, PE who presents today for anticoagulation monitoring and adjustment. Patient verifies current dosing regimen and tablet strength. No missed or extra doses. Patient denies s/s bleeding/bruising/swelling/SOB/chest pain  No blood in urine or stool. No dietary changes. No changes in medication/OTC agents/Herbals. No change in alcohol use or tobacco use. Patient reports he is becoming a little bit more active as he recovers from [facility]. Patient denies headaches/dizziness/falls. Patient reports occasional lightheadedness, but this is not abnormal for him. Patient will discuss with PCP. No vomiting/diarrhea or acute illness. No Procedures scheduled in the future at this time. Patient interview completed and discussed with pharmacist by Carolina Leahy    Assessment:    Lab Results   Component Value Date    INR 2.70 (H) 02/11/2022    INR 2.70 (H) 01/28/2022    INR 1.90 (H) 01/21/2022    PROTIME 12.4 01/15/2020     INR therapeutic   Recent Labs     02/11/22  1003   INR 2.70*     Second consecutive therapeutic INR on current regimen. Plan:  Continue Coumadin 10 mg MWF and 12.5 mg TuThSaSu. Recheck INR in 2 week(s) on 2/25. Patient reminded to call the Anticoagulation Clinic with any signs or symptoms of bleeding or with any medication changes. Patient given instructions utilizing the teach back method. After visit summary printed and reviewed with patient. Discharged ambulatory in no apparent distress.     For Pharmacy Admin Tracking Only     Total # of Interventions Recommended: 0   Time Spent (min): [address] PharmMIQUEL   2/11/2022, 10:22 AM

## 2022-02-23 ENCOUNTER — CLINICAL DOCUMENTATION (OUTPATIENT)
Dept: SPIRITUAL SERVICES | Facility: CLINIC | Age: 67
End: 2022-02-23

## 2022-02-23 NOTE — ACP (ADVANCE CARE PLANNING)
Advance Care Planning   Ambulatory ACP Specialist Patient Outreach    Date:  2/23/2022  ACP Specialist:  Jade Caballero    Outreach call to patient in follow-up to ACP Specialist referral from: Julisa Larsen DO    [x] PCP  [] Provider   [] Ambulatory Care Management [] Other for Reason:    [x] Advance Directive Assistance  [] Code Status Discussion  [] Complete Portable DNR Order  [x] Discuss Goals of Care  [] Complete POST/MOST  [] Early ACP Decision-Making  [] Other    Date Referral Received: 1/4/2022    Today's Outreach:  [] First   [] Second  [x] Third                               Third outreach made by [x]  phone  [] email []   [employer]     Intervention:  [] Spoke with Patient  [x] Left VM requesting return call      Outcome:    Monique Root made a 3rd and final attempt to contact Sabas Tidwellthomas" via telephone call to offer support, if desired, for the completion of Advance Directives documents as part of an [address] conversation. * No answer. Left message. *  sent a follow-up letter, brochures, and business card to assist moving forward. Next Step:   [] ACP scheduled conversation  [] Outreach again in one week               [x] Email / Mail ACP Info Sheets  [] Email / Mail Advance Directive            [x] Close Referral. Routing closure to referring provider/staff and to ACP Specialist .      Thank you for this referral.

## 2022-02-25 ENCOUNTER — APPOINTMENT (OUTPATIENT)
Dept: PHARMACY | Age: 67
End: 2022-02-25
Payer: MEDICARE

## 2022-02-28 ENCOUNTER — HOSPITAL ENCOUNTER (OUTPATIENT)
Dept: PHARMACY | Age: 67
Setting detail: THERAPIES SERIES
Discharge: HOME | End: 2022-02-28
Payer: MEDICARE

## 2022-02-28 DIAGNOSIS — I26.99 PULMONARY EMBOLISM, OTHER, UNSPECIFIED CHRONICITY, UNSPECIFIED WHETHER ACUTE COR PULMONALE PRESENT (HCC): Primary | ICD-10-CM

## 2022-02-28 DIAGNOSIS — Z51.81 ENCOUNTER FOR THERAPEUTIC DRUG MONITORING: ICD-10-CM

## 2022-02-28 DIAGNOSIS — Z79.01 ANTICOAGULATED ON COUMADIN: ICD-10-CM

## 2022-02-28 DIAGNOSIS — I82.4Z9 DEEP VEIN THROMBOSIS (DVT) OF DISTAL VEIN OF LOWER EXTREMITY, UNSPECIFIED CHRONICITY, UNSPECIFIED LATERALITY (HCC): ICD-10-CM

## 2022-02-28 LAB — POC INR: 1.8 (ref 0.8–1.2)

## 2022-02-28 PROCEDURE — 99212 OFFICE O/P EST SF 10 MIN: CPT

## 2022-02-28 PROCEDURE — 36416 COLLJ CAPILLARY BLOOD SPEC: CPT

## 2022-02-28 PROCEDURE — 85610 PROTHROMBIN TIME: CPT

## 2022-02-28 NOTE — PROGRESS NOTES
[facility] [address]  [phone] (phone)  [phone] (fax)    Mr. Amy Bloom is a 61 y.o.  male with history of DVT, PE who presents today for anticoagulation monitoring and adjustment. Patient verifies current dosing regimen and tablet strength. No missed or extra doses. Patient denies s/s bleeding/bruising/swelling/SOB/chest pain. No blood in urine or stool. Pt has had brussel sprouts recently this past week and has a variety of juices at home of which pt does drink raspberry-cranberry juice - advised to keep consistent and educated on Vitamin K containing food. No changes in medication/OTC agents/Herbals. No change in alcohol use or tobacco use. No change in activity level. Patient denies headaches/lightheadedness/falls. Pt states he does get dizzy when he gets up to fast - has oxygen at home which helps. Not a new problem for patient. No vomiting/diarrhea or acute illness. No Procedures scheduled in the future at this time. Assessment:   Lab Results   Component Value Date    INR 1.80 (H) 02/28/2022    INR 2.70 (H) 02/11/2022    INR 2.70 (H) 01/28/2022    PROTIME 12.4 01/15/2020     INR subtherapeutic   Recent Labs     02/28/22  0958   INR 1.80*     Subtherapeutic INR x 1    Patient very concerned about low INR - informed patient it could be the day of the week or metabolism changes. Wife did want more information about the INR - gave her new start pamphlet and educated about Vitamin K foods. They realized it was possibly the brussel sprouts he had. Patient and wife more content with the new information. Plan:  Coumadin 12.5 mg today (2/28) then continue Coumadin 10 mg MWF and 12.5 mg TuThSaSu. Recheck INR in 11 day(s) - moved up from 14 days due to scheduling (pt wants to be seen on Fridays due to wife's appointments).   Patient reminded to call the Anticoagulation Clinic with any signs or symptoms of bleeding or with any medication changes. Patient given instructions utilizing the teach back method. After visit summary printed and reviewed with patient and his wife. Discharged via assistance of a cane and in no apparent distress.     Odilia Oliveira PharmD  10:41 AM  2/28/2022      For Pharmacy Admin Tracking Only     Intervention Detail: Dose Adjustment: 1, reason: Therapy Optimization   Total # of Interventions Recommended: 1   Total # of Interventions Accepted: 1   Time Spent (min): 20

## 2022-03-15 ENCOUNTER — TELEPHONE (OUTPATIENT)
Dept: PHARMACY | Age: 67
End: 2022-03-15

## 2022-03-15 NOTE — TELEPHONE ENCOUNTER
Called patient to reschedule missed appointment. Patient is out of town in [facility], been there for a week now and does not know when he will be home. There with a friend who has had a stroke and helping them.

## 2022-03-15 NOTE — LETTER
[address] [facility]  [address] [facility] [address]  Phone: [phone]  Fax: [phone] [facility]      2022     Patient: Dasiy Gatica   MR Number: [id number]   YOB: 1955           RE: Daisy Gatica,  1955    Dear Dr. Kristal Burton: This letter is to inform you that your patient, Daisy Gatica, has been discharged from the Anticoagulation Service at [facility] for the following reason: patient is lost to follow up. Thank you for allowing us to work with your patients. We look forward to meeting your future needs. Please dont hesitate to contact the Anticoagulation Service office at [phone] if you have any questions or concerns.       Thanks,    [facility]

## 2022-03-15 NOTE — LETTER
Dear Bety Gallegos,    This letter is to inform you that you are being discharged from [facility] Services at [facility]. Your last appointment with us was on 2/28/22 and you have not responded to our calls or letters requesting you reschedule an appointment for follow-up. This notice is to confirm with you that we will no longer be responsible for monitoring and/or management of your anticoagulation therapy and we will not be able to provide any further refills for your warfarin (Coumadin). Please be advised that warfarin (Coumadin) therapy can place you at risk for potentially serious side effects related to bleeding. You should not be taking warfarin (Coumadin) without close supervision by a physician or appropriately trained health care professional. Be advised that this letter is only confirming your discharge from the Anticoagulation Service; please check with Dr. Qing Aguilera, to determine the appropriate provider for your INR management. If you should experience any problems within thirty days, we will be available to assist you until you become established with your new anticoagulation therapy management provider.     Thank you,    [facility]

## 2022-04-07 PROBLEM — Z79.01 ANTICOAGULATED ON COUMADIN: Status: RESOLVED | Noted: 2022-01-04 | Resolved: 2022-04-07

## 2022-04-07 PROBLEM — Z51.81 ENCOUNTER FOR THERAPEUTIC DRUG MONITORING: Status: RESOLVED | Noted: 2022-01-04 | Resolved: 2022-04-07

## 2022-04-07 RX ORDER — WARFARIN SODIUM 5 MG/1
TABLET ORAL
COMMUNITY
End: 2022-10-21

## 2022-04-07 NOTE — TELEPHONE ENCOUNTER
Pt has not responded to multiple phone messages or mailed letter to reschedule. Will discharge pt as per protocol.

## 2022-04-11 DIAGNOSIS — J44.9 CHRONIC OBSTRUCTIVE PULMONARY DISEASE, UNSPECIFIED COPD TYPE (HCC): ICD-10-CM

## 2022-04-11 RX ORDER — ALBUTEROL SULFATE 90 UG/1
AEROSOL, METERED RESPIRATORY (INHALATION)
Qty: 8.5 G | Refills: 3 | Status: SHIPPED | OUTPATIENT
Start: 2022-04-11

## 2022-04-28 DIAGNOSIS — E11.9 TYPE 2 DIABETES MELLITUS WITHOUT COMPLICATION, WITHOUT LONG-TERM CURRENT USE OF INSULIN (HCC): ICD-10-CM

## 2022-04-28 RX ORDER — GLIPIZIDE 10 MG/1
TABLET ORAL
Qty: 180 TABLET | Refills: 3 | Status: SHIPPED | OUTPATIENT
Start: 2022-04-28 | End: 2022-10-21 | Stop reason: SDUPTHER

## 2022-05-02 RX ORDER — BUPROPION HYDROCHLORIDE 300 MG/1
300 TABLET ORAL EVERY MORNING
Qty: 30 TABLET | Refills: 0 | Status: SHIPPED | OUTPATIENT
Start: 2022-05-02 | End: 2022-06-06 | Stop reason: SDUPTHER

## 2022-05-02 NOTE — TELEPHONE ENCOUNTER
[facility] is requesting a medication refill on Silverio's behalf for Wellbutrin XL 300mg;#30 with 3 refills; last with a start date of 12/01/21 and last refill date of 03/31/22. Medication is pending your approval for a 30 day supply with 0 refills; he is scheduled to return on 05/25/22. Last seen on 09/29/21.

## 2022-06-06 ENCOUNTER — TELEMEDICINE (OUTPATIENT)
Dept: PSYCHIATRY | Age: 67
End: 2022-06-06
Payer: COMMERCIAL

## 2022-06-06 DIAGNOSIS — F43.21 GRIEF: ICD-10-CM

## 2022-06-06 DIAGNOSIS — F32.A DEPRESSION, UNSPECIFIED DEPRESSION TYPE: Primary | ICD-10-CM

## 2022-06-06 DIAGNOSIS — G89.21 CHRONIC PAIN DUE TO TRAUMA: ICD-10-CM

## 2022-06-06 DIAGNOSIS — F41.9 ANXIETY: ICD-10-CM

## 2022-06-06 DIAGNOSIS — R45.86 MOOD SWINGS: ICD-10-CM

## 2022-06-06 PROCEDURE — 99213 OFFICE O/P EST LOW 20 MIN: CPT | Performed by: PSYCHIATRY & NEUROLOGY

## 2022-06-06 PROCEDURE — 1123F ACP DISCUSS/DSCN MKR DOCD: CPT | Performed by: PSYCHIATRY & NEUROLOGY

## 2022-06-06 RX ORDER — BUPROPION HYDROCHLORIDE 300 MG/1
300 TABLET ORAL EVERY MORNING
Qty: 30 TABLET | Refills: 3 | Status: SHIPPED | OUTPATIENT
Start: 2022-06-06 | End: 2022-09-21 | Stop reason: SDUPTHER

## 2022-06-06 RX ORDER — TRAZODONE HYDROCHLORIDE 100 MG/1
100 TABLET ORAL NIGHTLY
Qty: 30 TABLET | Refills: 3 | Status: SHIPPED | OUTPATIENT
Start: 2022-06-06 | End: 2022-09-21 | Stop reason: SDUPTHER

## 2022-06-06 RX ORDER — AMITRIPTYLINE HYDROCHLORIDE 50 MG/1
50 TABLET, FILM COATED ORAL NIGHTLY
Qty: 30 TABLET | Refills: 3 | Status: SHIPPED | OUTPATIENT
Start: 2022-06-06 | End: 2022-09-21 | Stop reason: SDUPTHER

## 2022-06-06 NOTE — PROGRESS NOTES
[address] [facility]  [facility] [address] [phone]    Progress Note    Patient:  Zhen Jain  YOB: 1955  PCP:  Zaira Osorio DO  Visit Date:  6/6/2022    TELEHEALTH EVALUATION -- Audio/Visual (During AFSPY-19 public health emergency)    Patient location: home  Physician location: [address]  This virtual visit was conducted via interactive, real-time video. Chief Complaint   Patient presents with    Follow-up    Medication Check    Depression    Anxiety    Mood Swings       SUBJECTIVE:      Zhen Jain, a 74 y.o. male, presents for a follow up visit. Patient reports he is stable. Patient is compliant with medication regimen. He presents alone. Doing ok. Migraine the last few weeks. Has a HA right now. Wife lost her best friend a couple weeks ago and trying to be supportive. Ongoing grief with a \"rough\" last year. Lost his mom and a couple other friends. He notes having covid set him back. Hard to walk, worse since covid with more SOB. Using inhalers that helps. Previously was on oxygen but decided he didn't want to rely on that. .   Denies Wellbutrin worsening migraine. Has mood benefit from it. Uses Excedrin migraine noting he took 3 already this morning. HA improving. Mood is depressed at times \"I have my days. \" Feels he can manage it. Declines to make any med changes. Not much anxiety lately. Depression feeling worse. Sleep is an issue, up every couple hours. Advised we can increase trazodone going forward if desired which he will consider. Med Trials:Cymbalta (fatigue? No benefit), Elavil, trazodone, Ambien, Wellbutrin    OBJECTIVE:  Vitals: There were no vitals taken for this visit.     MENTAL STATUS EXAM:    GENERAL  Build: Overweight    Hygiene:  Appropriate in casual dress   SENSORIUM Orientation: Place, Person, Time, & Situation     Consciousness: Alert    ATTENTION   Focused    RELATEDNESS  Cooperative    EYE CONTACT   Good    PSYCHOMOTOR  Normal    SPEECH Volume: Normal     Rate: Normal rate and somewhat down tone    Amplitude: Within normal limits   MOOD  Dysphoric,  somewhat   AFFECT Range: Full, social smile present   THOUGHT Process:  Goal-Directed     Content: no evidence of psychosis    COGNITION Insight: Good    Judgement:  Intact    MEMORY  Intact    INTELLIGENCE  Average     Mobility/Gait: Independently     Controlled SubstancesMonitoring:   not done today      ASSESSMENT: He declines med changes today. Still some issues with mood and sleep. Consider whether Wellbutrin contributing to HA. Wellbutrin improved depression and mood swings. Diagnosis Orders   1. Depression, unspecified depression type     2. Anxiety     3. Chronic pain due to trauma     4. Mood swings     5. Grief     R/o OUSMANE  Medical Hx: COPD, CHF, chronic neck and back pain    PLAN:     · Medications:   · Trazodone 100 mg HS  · Elavil 50 mg HS  · Wellbutrin  mg QAM  · Therapy: sees Dr. Harini Delarosa  · Labs/Tests/Imaging: none   · Records Reviewed: [facility]  · Patient advised to call if patient has any difficulties with treatment  Return in about 4 months (around 10/6/2022) for med check, follow up. Ashley Cooney, was evaluated through a synchronous (real-time) audio-video encounter. The patient (or guardian if applicable) is aware that this is a billable service, which includes applicable copays. This virtual visit was conducted with patient's (and/or legal guardian's) consent. The visit was conducted pursuant to the emergency declaration under the [address] [place] [address] [facility] authority and the [employer] Act. Patient identification was verified, and a caregiver was present when appropriate.  The patient was located in a state where the provider was licensed to provide care.       Total time spent for this encounter: not billed by time    Electronically signed by Shaniqua Alford MD on 6/6/2022 at 12:02 PM

## 2022-09-21 ENCOUNTER — TELEMEDICINE (OUTPATIENT)
Dept: PSYCHIATRY | Age: 67
End: 2022-09-21
Payer: COMMERCIAL

## 2022-09-21 DIAGNOSIS — R45.86 MOOD SWINGS: ICD-10-CM

## 2022-09-21 DIAGNOSIS — F32.9 CURRENT EPISODE OF MAJOR DEPRESSIVE DISORDER WITHOUT PRIOR EPISODE, UNSPECIFIED DEPRESSION EPISODE SEVERITY: Primary | ICD-10-CM

## 2022-09-21 DIAGNOSIS — G89.21 CHRONIC PAIN DUE TO TRAUMA: ICD-10-CM

## 2022-09-21 DIAGNOSIS — F41.9 ANXIETY: ICD-10-CM

## 2022-09-21 PROCEDURE — 1123F ACP DISCUSS/DSCN MKR DOCD: CPT | Performed by: PSYCHIATRY & NEUROLOGY

## 2022-09-21 PROCEDURE — 99213 OFFICE O/P EST LOW 20 MIN: CPT | Performed by: PSYCHIATRY & NEUROLOGY

## 2022-09-21 RX ORDER — BUPROPION HYDROCHLORIDE 300 MG/1
300 TABLET ORAL EVERY MORNING
Qty: 30 TABLET | Refills: 3 | Status: SHIPPED | OUTPATIENT
Start: 2022-09-21

## 2022-09-21 RX ORDER — AMITRIPTYLINE HYDROCHLORIDE 50 MG/1
50 TABLET, FILM COATED ORAL NIGHTLY
Qty: 30 TABLET | Refills: 3 | Status: SHIPPED | OUTPATIENT
Start: 2022-09-21

## 2022-09-21 RX ORDER — TRAZODONE HYDROCHLORIDE 100 MG/1
100 TABLET ORAL NIGHTLY
Qty: 30 TABLET | Refills: 3 | Status: SHIPPED | OUTPATIENT
Start: 2022-09-21

## 2022-09-21 NOTE — PROGRESS NOTES
[address] [facility] PSYCHIATRY  [facility] [id number] [phone]    Progress Note    Patient:  Yessy Velasco  YOB: 1955  PCP:  Vivian Nguyen DO  Visit Date:  9/21/2022    TELEHEALTH EVALUATION -- Audio/Visual (During LCZNN-83 public health emergency)    Patient location: home  Physician location: [place]  This virtual visit was conducted via interactive, real-time video. Chief Complaint   Patient presents with    Follow-up    Medication Check    Depression    Anxiety         SUBJECTIVE:      Yessy Velasco, a 74 y.o. male, presents for a follow up visit. Patient reports he is  stable . Patient is compliant with medication regimen. He presents alone. Doing \"alright\". More stress lately. Ex wife is in hospice. Has been spending a lot of time at the hospital. \"Not ready\" for her die. She's in renal failure. They were together 33 years. Have 2 kids together. Tries to stay busy. When he's alone thoughts worsen. Mood \"comes and goes\". Jennifer his SO says he \"snaps\" at her. He does feel more irritable lately. Sleep fragmented, up every 2 hours \"like always\". Declines to make any med changes today. Had to cancel therapy last week and went to hospital to see his ex instead. Has been talking to his friends about it. Good support. Med Trials:Cymbalta (fatigue? No benefit), Elavil, trazodone, Ambien, Wellbutrin    OBJECTIVE:  Vitals: There were no vitals taken for this visit. MENTAL STATUS EXAM:    GENERAL  Build: Overweight    Hygiene:  Appropriate   SENSORIUM Orientation: Place, Person, Time, & Situation     Consciousness: Alert    ATTENTION   Focused    RELATEDNESS  Cooperative    EYE CONTACT   Good    PSYCHOMOTOR  Normal    SPEECH Volume: Normal     Rate: Normal rate and down tone    Amplitude:  Within normal limits   MOOD  Dysphoric,  somewhat   AFFECT Range: Limited , tearful

## 2022-10-21 ENCOUNTER — OFFICE VISIT (OUTPATIENT)
Dept: FAMILY MEDICINE CLINIC | Age: 67
End: 2022-10-21
Payer: MEDICARE

## 2022-10-21 VITALS
WEIGHT: 315 LBS | RESPIRATION RATE: 18 BRPM | TEMPERATURE: 97 F | DIASTOLIC BLOOD PRESSURE: 78 MMHG | HEART RATE: 91 BPM | SYSTOLIC BLOOD PRESSURE: 138 MMHG | OXYGEN SATURATION: 95 % | BODY MASS INDEX: 45.1 KG/M2 | HEIGHT: 70 IN

## 2022-10-21 DIAGNOSIS — E66.01 CLASS 3 SEVERE OBESITY DUE TO EXCESS CALORIES WITH SERIOUS COMORBIDITY AND BODY MASS INDEX (BMI) OF 50.0 TO 59.9 IN ADULT (HCC): ICD-10-CM

## 2022-10-21 DIAGNOSIS — E11.65 TYPE 2 DIABETES MELLITUS WITH HYPERGLYCEMIA, WITHOUT LONG-TERM CURRENT USE OF INSULIN (HCC): ICD-10-CM

## 2022-10-21 DIAGNOSIS — E11.9 TYPE 2 DIABETES MELLITUS WITHOUT COMPLICATION, WITHOUT LONG-TERM CURRENT USE OF INSULIN (HCC): ICD-10-CM

## 2022-10-21 DIAGNOSIS — Z91.81 AT HIGH RISK FOR FALLS: ICD-10-CM

## 2022-10-21 DIAGNOSIS — F33.1 MODERATE EPISODE OF RECURRENT MAJOR DEPRESSIVE DISORDER (HCC): ICD-10-CM

## 2022-10-21 DIAGNOSIS — L30.9 DERMATITIS: ICD-10-CM

## 2022-10-21 DIAGNOSIS — E11.22 TYPE 2 DIABETES MELLITUS WITH CHRONIC KIDNEY DISEASE, WITHOUT LONG-TERM CURRENT USE OF INSULIN, UNSPECIFIED CKD STAGE (HCC): ICD-10-CM

## 2022-10-21 DIAGNOSIS — R80.9 MICROALBUMINURIA DUE TO TYPE 2 DIABETES MELLITUS (HCC): ICD-10-CM

## 2022-10-21 DIAGNOSIS — I50.23 ACUTE ON CHRONIC SYSTOLIC CONGESTIVE HEART FAILURE (HCC): Primary | ICD-10-CM

## 2022-10-21 DIAGNOSIS — R60.9 DEPENDENT EDEMA: ICD-10-CM

## 2022-10-21 DIAGNOSIS — E11.29 MICROALBUMINURIA DUE TO TYPE 2 DIABETES MELLITUS (HCC): ICD-10-CM

## 2022-10-21 DIAGNOSIS — M54.16 LUMBAR RADICULOPATHY: ICD-10-CM

## 2022-10-21 LAB — HBA1C MFR BLD: 9.8 % (ref 4.3–5.7)

## 2022-10-21 PROCEDURE — 99214 OFFICE O/P EST MOD 30 MIN: CPT | Performed by: FAMILY MEDICINE

## 2022-10-21 PROCEDURE — G8417 CALC BMI ABV UP PARAM F/U: HCPCS | Performed by: FAMILY MEDICINE

## 2022-10-21 PROCEDURE — G8484 FLU IMMUNIZE NO ADMIN: HCPCS | Performed by: FAMILY MEDICINE

## 2022-10-21 PROCEDURE — 2022F DILAT RTA XM EVC RTNOPTHY: CPT | Performed by: FAMILY MEDICINE

## 2022-10-21 PROCEDURE — G8427 DOCREV CUR MEDS BY ELIG CLIN: HCPCS | Performed by: FAMILY MEDICINE

## 2022-10-21 PROCEDURE — 3017F COLORECTAL CA SCREEN DOC REV: CPT | Performed by: FAMILY MEDICINE

## 2022-10-21 PROCEDURE — 1123F ACP DISCUSS/DSCN MKR DOCD: CPT | Performed by: FAMILY MEDICINE

## 2022-10-21 PROCEDURE — 3046F HEMOGLOBIN A1C LEVEL >9.0%: CPT | Performed by: FAMILY MEDICINE

## 2022-10-21 PROCEDURE — 1036F TOBACCO NON-USER: CPT | Performed by: FAMILY MEDICINE

## 2022-10-21 RX ORDER — BUMETANIDE 1 MG/1
TABLET ORAL
Qty: 270 TABLET | Refills: 3 | Status: SHIPPED | OUTPATIENT
Start: 2022-10-21

## 2022-10-21 RX ORDER — GLIPIZIDE 10 MG/1
TABLET ORAL
Qty: 180 TABLET | Refills: 3 | Status: SHIPPED | OUTPATIENT
Start: 2022-10-21

## 2022-10-21 RX ORDER — PHENTERMINE HYDROCHLORIDE 37.5 MG/1
37.5 TABLET ORAL
Qty: 30 TABLET | Refills: 0 | Status: SHIPPED | OUTPATIENT
Start: 2022-10-21 | End: 2022-11-20

## 2022-10-21 ASSESSMENT — PATIENT HEALTH QUESTIONNAIRE - PHQ9
SUM OF ALL RESPONSES TO PHQ QUESTIONS 1-9: 10
SUM OF ALL RESPONSES TO PHQ9 QUESTIONS 1 & 2: 1
4. FEELING TIRED OR HAVING LITTLE ENERGY: 3
SUM OF ALL RESPONSES TO PHQ QUESTIONS 1-9: 10
7. TROUBLE CONCENTRATING ON THINGS, SUCH AS READING THE NEWSPAPER OR WATCHING TELEVISION: 3
5. POOR APPETITE OR OVEREATING: 0
SUM OF ALL RESPONSES TO PHQ QUESTIONS 1-9: 10
3. TROUBLE FALLING OR STAYING ASLEEP: 3
10. IF YOU CHECKED OFF ANY PROBLEMS, HOW DIFFICULT HAVE THESE PROBLEMS MADE IT FOR YOU TO DO YOUR WORK, TAKE CARE OF THINGS AT HOME, OR GET ALONG WITH OTHER PEOPLE: 1
1. LITTLE INTEREST OR PLEASURE IN DOING THINGS: 0
2. FEELING DOWN, DEPRESSED OR HOPELESS: 1
SUM OF ALL RESPONSES TO PHQ QUESTIONS 1-9: 10
6. FEELING BAD ABOUT YOURSELF - OR THAT YOU ARE A FAILURE OR HAVE LET YOURSELF OR YOUR FAMILY DOWN: 0
8. MOVING OR SPEAKING SO SLOWLY THAT OTHER PEOPLE COULD HAVE NOTICED. OR THE OPPOSITE, BEING SO FIGETY OR RESTLESS THAT YOU HAVE BEEN MOVING AROUND A LOT MORE THAN USUAL: 0
9. THOUGHTS THAT YOU WOULD BE BETTER OFF DEAD, OR OF HURTING YOURSELF: 0

## 2022-10-21 NOTE — PROGRESS NOTES
Tami Artis is a 74 y.o. male that presents for     Chief Complaint   Patient presents with    Follow-up       /78   Pulse 91   Temp 97 °F (36.1 °C) (Infrared)   Resp 18   Ht 5' 9.5\" (1.765 m)   Wt (!) 368 lb 6.4 oz (167.1 kg)   SpO2 95%   BMI 53.62 kg/m²       HPI    States that he has been feeling well for the most part. Mood has been doing well, working with Dr Donita Cordon. COPD    HPI:    Current medication regimen - albuterol  Compliant with medications? yes    Limitations in function - 'I run out of wind quick'  Does patient smoke? No    Chronic cough?: no  Chest pain/Tightness?:  no  Shortness of breath?: yes  Wheezing?  no    Hospitalized and/or intubated in the past?: Yes  Number of times prescribed oral steroids in the past year - 1      Diabetes Type 2    Glucose control:   Does patient check blood glucoses at home? No  Report of hypoglycemia: no  Lab Results   Component Value Date    LABA1C 9.8 (H) 10/21/2022     No results found for: EAG    Symptoms  Polyuria, Polydipsia or Polyphagia? Yes - all 3  Chest Pain, SOB, or Palpitations? -  Yes - SOB    Medications  Current medication were reviewed. Compliant with medications? Has been out of metformin for several months  Medication side effects? No  On ACE-I or ARB? Yes  On antiplatelet therapy? Yes  On Statin?   No      Wt Readings from Last 3 Encounters:   10/21/22 (!) 368 lb 6.4 oz (167.1 kg)   01/04/22 (!) 352 lb (159.7 kg)   06/24/20 (!) 396 lb (179.6 kg)       Blood pressure control:  BP Readings from Last 3 Encounters:   10/21/22 138/78   06/24/20 124/80   01/24/20 (!) 146/74       No results found for: Markel Maradiaga    Lab Results   Component Value Date    [address] 111 10/03/2019           Health Maintenance   Topic Date Due    COVID-19 Vaccine (1) Never done    Diabetic foot exam  Never done    Depression Monitoring  Never done    HIV screen  Never done    Diabetic retinal exam  Never done    Hepatitis C screen  Never done    DTaP/Tdap/Td vaccine (1 - Tdap) Never done    Shingles vaccine (1 of 2) Never done    Low dose CT lung screening  Never done    Diabetic microalbuminuria test  2019    Pneumococcal 65+ years Vaccine (2 - PCV) 10/10/2019    Colorectal Cancer Screen  2020    Flu vaccine (1) 2022    Lipids  2022    A1C test (Diabetic or Prediabetic)  2023    AAA screen  Completed    Hepatitis A vaccine  Aged Out    Hib vaccine  Aged Out    Meningococcal (ACWY) vaccine  Aged Out       Past Medical History:   Diagnosis Date    Asthma     CHF (congestive heart failure) (HCC)     Chronic back pain     Chronic neck pain     Obesity        Past Surgical History:   Procedure Laterality Date    COLONOSCOPY  4/17/15    OTHER SURGICAL HISTORY      Had face surgery at [address] [place] [address]     Dr. Leena Salmeron       Social History     Tobacco Use    Smoking status: Former     Packs/day: 0.50     Years: 40.00     Pack years: 20.00     Types: Cigarettes     Start date: 6/10/1974     Quit date: 3/20/2017     Years since quittin.5    Smokeless tobacco: Never    Tobacco comments:     information given at past appts   Substance Use Topics    Alcohol use: Yes     Comment: very little    Drug use: No       Family History   Problem Relation Age of Onset    Arthritis Father     Stroke Father     Heart Attack Father          I have reviewed the patient's past medical history, past surgical history, allergies, medications, social and family history and I have made updates where appropriate. Review of Systems        PHYSICAL EXAM:  /78   Pulse 91   Temp 97 °F (36.1 °C) (Infrared)   Resp 18   Ht 5' 9.5\" (1.765 m)   Wt (!) 368 lb 6.4 oz (167.1 kg)   SpO2 95%   BMI 53.62 kg/m²     Physical Exam  Vitals and nursing note reviewed. Constitutional:       General: He is not in acute distress. Appearance: He is well-developed.    HENT:      Head: Normocephalic and atraumatic. Right Ear: Hearing and external ear normal.      Left Ear: Hearing and external ear normal.      Nose: Nose normal.   Eyes:      General:         Right eye: No discharge. Left eye: No discharge. Conjunctiva/sclera: Conjunctivae normal.   Neck:      Thyroid: No thyromegaly. Trachea: No tracheal deviation. Cardiovascular:      Rate and Rhythm: Normal rate and regular rhythm. Heart sounds: Normal heart sounds. No murmur heard. No friction rub. No gallop. Pulmonary:      Effort: Pulmonary effort is normal. No respiratory distress. Breath sounds: No wheezing or rales. Chest:      Chest wall: No tenderness. Musculoskeletal:         General: No deformity. Cervical back: Normal range of motion and neck supple. Lymphadenopathy:      Cervical: No cervical adenopathy. Skin:     General: Skin is warm and dry. Findings: No erythema or rash. Neurological:      Mental Status: He is alert. Motor: No abnormal muscle tone. Coordination: Coordination normal.   Psychiatric:         Behavior: Behavior normal.         Thought Content: Thought content normal.         Judgment: Judgment normal.           ASSESSMENT & PLAN  Rianna Delvalle was seen today for follow-up. Diagnoses and all orders for this visit:    Acute on chronic systolic congestive heart failure (HCC)    Type 2 diabetes mellitus without complication, without long-term current use of insulin (HCC)  -     POCT glycosylated hemoglobin (Hb A1C)  -     glipiZIDE (GLUCOTROL) 10 MG tablet; take 1 tablet by mouth twice a day  -     metFORMIN (GLUCOPHAGE) 1000 MG tablet;  Take 1 tablet by mouth 2 times daily (with meals)    Moderate episode of recurrent major depressive disorder (HCC)    At high risk for falls    Type 2 diabetes mellitus with hyperglycemia, without long-term current use of insulin (HCC)    Type 2 diabetes mellitus with chronic kidney disease, without long-term current use of insulin, unspecified CKD stage (HCC)    Body mass index (BMI) 50.0-59.9, adult ([facility])    Microalbuminuria due to type 2 diabetes mellitus (HCC)    Dependent edema  -     bumetanide (BUMEX) 1 MG tablet; Take 2 tabs PO q AM and take 1 tabs PO qhs    Dermatitis  -     betamethasone valerate (VALISONE) 0.1 % cream; Apply topically 2 times daily. Class 3 severe obesity due to excess calories with serious comorbidity and body mass index (BMI) of 50.0 to 59.9 in adult ([facility])  -     phentermine (ADIPEX-P) 37.5 MG tablet; Take 1 tablet by mouth every morning (before breakfast) for 30 days. Lumbar radiculopathy  -     Handicap Placard MISC; by Does not apply route Expires 10/21/2027      Return in about 3 months (around 1/21/2023). -restart metformin for uncontrolled DM2  -Other chronic issues are stable, continue current medications  -Advised to call if any issues      The most recent results of the following tests were reviewed with the patient today: A1c     All copied or forwarded information in the progress note was verified by me to be accurate at the time of visit  Patient's past medical, surgical, social and family history were reviewed and updated     All patient questions answered. Patient voiced understanding. On the basis of positive falls risk screening, assessment and plan is as follows: home safety tips provided.

## 2023-03-13 ENCOUNTER — TELEMEDICINE (OUTPATIENT)
Dept: PSYCHIATRY | Age: 68
End: 2023-03-13
Payer: MEDICARE

## 2023-03-13 DIAGNOSIS — F41.9 ANXIETY: ICD-10-CM

## 2023-03-13 DIAGNOSIS — G89.21 CHRONIC PAIN DUE TO TRAUMA: ICD-10-CM

## 2023-03-13 DIAGNOSIS — F32.9 CURRENT EPISODE OF MAJOR DEPRESSIVE DISORDER WITHOUT PRIOR EPISODE, UNSPECIFIED DEPRESSION EPISODE SEVERITY: Primary | ICD-10-CM

## 2023-03-13 DIAGNOSIS — R45.86 MOOD SWINGS: ICD-10-CM

## 2023-03-13 PROCEDURE — G8427 DOCREV CUR MEDS BY ELIG CLIN: HCPCS | Performed by: PSYCHIATRY & NEUROLOGY

## 2023-03-13 PROCEDURE — 99214 OFFICE O/P EST MOD 30 MIN: CPT | Performed by: PSYCHIATRY & NEUROLOGY

## 2023-03-13 PROCEDURE — 1123F ACP DISCUSS/DSCN MKR DOCD: CPT | Performed by: PSYCHIATRY & NEUROLOGY

## 2023-03-13 PROCEDURE — 3017F COLORECTAL CA SCREEN DOC REV: CPT | Performed by: PSYCHIATRY & NEUROLOGY

## 2023-03-13 RX ORDER — BUPROPION HYDROCHLORIDE 150 MG/1
150 TABLET ORAL EVERY MORNING
Qty: 30 TABLET | Refills: 2 | Status: SHIPPED | OUTPATIENT
Start: 2023-03-13

## 2023-03-13 RX ORDER — AMITRIPTYLINE HYDROCHLORIDE 50 MG/1
50 TABLET, FILM COATED ORAL NIGHTLY
Qty: 30 TABLET | Refills: 2 | Status: SHIPPED | OUTPATIENT
Start: 2023-03-13

## 2023-03-13 NOTE — PROGRESS NOTES
[facility] [place] [facility]  [facility] PSYCHIATRY  [address] [facility] [id number] [phone]    Progress Note    Patient:  Evans Page  YOB: 1955  PCP:  Jamin Fragoso DO  Visit Date:  3/13/2023    TELEHEALTH EVALUATION -- Audio/Visual (During COVID-19 public health emergency)    Patient location: home  Physician location: [facility], [place], [place]  This virtual visit was conducted via interactive, real-time video.      Chief Complaint   Patient presents with    Follow-up    Medication Check    Depression    Anxiety    Mood Swings       SUBJECTIVE:      Evans Page, a 67 y.o. male, presents for a follow up visit.  Patient reports he is  stable .  Patient is compliant with medication regimen.      He presents alone.   Doing ok overall.   Still dealing with frequent HA. Can be severe. Happen almost every day. We again discussed Wellbutrin commonly causes HA.   Mood is fair. Still has some days of feeling worse mood which he can manage.   Notes covid changed his body. Now gets cold easily. Ready for spring weather.  Grieving the loss of several friends in the last year. Dealing with it ok. His wife notes he's dreaming about it. He doesn't remember it but will say things in his sleep.  He stopped taking trazodone. Notes sleep unchanged. Up every 2 hours.  Appetite is ok.   Discussed tx options and we agree to reduce Wellbutrin dose to see if causing HA. Will monitor for any worsening of mood.   No SI.       Med Trials:Cymbalta (fatigue? No benefit), Elavil, trazodone, Ambien, Wellbutrin    OBJECTIVE:  Vitals: There were no vitals taken for this visit.    MENTAL STATUS EXAM:    GENERAL  Build: Overweight    Hygiene:  Appropriate in casual dress   SENSORIUM Orientation: Place, Person, Time, & Situation     Consciousness: Alert    ATTENTION   Focused    RELATEDNESS  Cooperative    EYE CONTACT   Good    PSYCHOMOTOR  Normal    SPEECH  Volume: Normal     Rate: Normal rate and tone    Amplitude: Within normal limits   MOOD  Euthymic   AFFECT Range: Full   THOUGHT Process:  Goal-Directed     Content: no evidence of psychosis    COGNITION Insight: Good    Judgement:  Intact    MEMORY  Intact    INTELLIGENCE  Average     Mobility/Gait: Independently     Controlled SubstancesMonitoring:   not done today      ASSESSMENT: Will decrease Wellbutrin to see if causing HA. Wellbutrin improved depression and mood swings. Diagnosis Orders   1. Current episode of major depressive disorder without prior episode, unspecified depression episode severity        2. Anxiety        3. Chronic pain due to trauma        4. Mood swings        R/o OUSMANE  Medical Hx: COPD, CHF, chronic neck and back pain      PLAN:     Medications:   No longer taking Trazodone 100 mg HS  Elavil 50 mg HS  Decrease Wellbutrin  mg QAM to 150 mg QAM  Therapy: sees Dr. Debra Calderon  Labs/Tests/Imaging: none   Records Reviewed: [facility]  Patient advised to call if patient has any difficulties with treatment  Return in about 4 weeks (around 4/10/2023) for follow up, med brittney Lozano, was evaluated through a synchronous (real-time) audio-video encounter. The patient (or guardian if applicable) is aware that this is a billable service, which includes applicable copays. This virtual visit was conducted with patient's (and/or legal guardian's) consent. The visit was conducted pursuant to the emergency declaration under the [facility], [facility] waiver authority and the [employer] Act. Patient identification was verified, and a caregiver was present when appropriate. The patient was located in a state where the provider was licensed to provide care.       Total time spent for this encounter: not billed by time    Electronically signed by Mack Irizarry MD on 3/13/2023 at 4:29 PM

## 2023-04-25 ENCOUNTER — TELEMEDICINE (OUTPATIENT)
Dept: PSYCHIATRY | Age: 68
End: 2023-04-25
Payer: MEDICARE

## 2023-04-25 DIAGNOSIS — F32.9 CURRENT EPISODE OF MAJOR DEPRESSIVE DISORDER WITHOUT PRIOR EPISODE, UNSPECIFIED DEPRESSION EPISODE SEVERITY: Primary | ICD-10-CM

## 2023-04-25 DIAGNOSIS — R45.86 MOOD SWINGS: ICD-10-CM

## 2023-04-25 DIAGNOSIS — F41.9 ANXIETY: ICD-10-CM

## 2023-04-25 DIAGNOSIS — G89.21 CHRONIC PAIN DUE TO TRAUMA: ICD-10-CM

## 2023-04-25 PROCEDURE — 1123F ACP DISCUSS/DSCN MKR DOCD: CPT | Performed by: PSYCHIATRY & NEUROLOGY

## 2023-04-25 PROCEDURE — G8427 DOCREV CUR MEDS BY ELIG CLIN: HCPCS | Performed by: PSYCHIATRY & NEUROLOGY

## 2023-04-25 PROCEDURE — 99213 OFFICE O/P EST LOW 20 MIN: CPT | Performed by: PSYCHIATRY & NEUROLOGY

## 2023-04-25 PROCEDURE — 3017F COLORECTAL CA SCREEN DOC REV: CPT | Performed by: PSYCHIATRY & NEUROLOGY

## 2023-04-25 RX ORDER — BUPROPION HYDROCHLORIDE 150 MG/1
150 TABLET ORAL EVERY MORNING
Qty: 30 TABLET | Refills: 2 | Status: SHIPPED | OUTPATIENT
Start: 2023-04-25

## 2023-04-25 RX ORDER — AMITRIPTYLINE HYDROCHLORIDE 50 MG/1
50 TABLET, FILM COATED ORAL NIGHTLY
Qty: 30 TABLET | Refills: 2 | Status: SHIPPED | OUTPATIENT
Start: 2023-04-25

## 2023-04-25 NOTE — PROGRESS NOTES
[address] [facility] PSYCHIATRY  [facility] [address] [phone]    Progress Note    Patient:  Vijay Quintana  YOB: 1955  PCP:  Johny Giles DO  Visit Date:  4/25/2023    TELEHEALTH EVALUATION -- Audio/Visual (During GDOJZ-02 public health emergency)    Patient location: home  Physician location: [place], [facility]  This virtual visit was conducted via interactive, real-time video. Chief Complaint   Patient presents with    Follow-up    Medication Check    Depression    Anxiety    Mood Swings       SUBJECTIVE:      Vijay Quintana, a 74 y.o. male, presents for a follow up visit. Patient reports he is  stable . Patient is compliant with medication regimen. He presents alone. Doing well. Notes changing weather which helped his pain. HA improved with reduced Wellbutrin dose and cutting back caffeine use. No worsening of mood. 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 Has to pace himself d/t back issues. Keeping busy helps his thoughts. Sleep still a problem. Feels it will never change. Up and down every few hours. 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 Sees Pain Mgmt at [facility]. Pain is feeling under control. In therapy once monthly. Declines to make any med changes today. Med Trials:Cymbalta (fatigue? No benefit), Elavil, trazodone, Ambien, Wellbutrin    OBJECTIVE:  Vitals: There were no vitals taken for this visit. MENTAL STATUS EXAM:    GENERAL  Build: Overweight    Hygiene:  Appropriate   SENSORIUM Orientation: Place, Person, Time, & Situation     Consciousness: Alert    ATTENTION   Focused    RELATEDNESS  Cooperative    EYE CONTACT   Good    PSYCHOMOTOR  Normal    SPEECH Volume: Normal     Rate: Normal rate and tone    Amplitude:  Within normal limits   MOOD  Euthymic   AFFECT Range: Full, mood congruent   THOUGHT Process:  Goal-Directed     Content: no evidence

## 2023-07-25 ENCOUNTER — TELEMEDICINE (OUTPATIENT)
Dept: PSYCHIATRY | Age: 68
End: 2023-07-25
Payer: COMMERCIAL

## 2023-07-25 DIAGNOSIS — R45.86 MOOD SWINGS: ICD-10-CM

## 2023-07-25 DIAGNOSIS — F32.9 CURRENT EPISODE OF MAJOR DEPRESSIVE DISORDER WITHOUT PRIOR EPISODE, UNSPECIFIED DEPRESSION EPISODE SEVERITY: Primary | ICD-10-CM

## 2023-07-25 DIAGNOSIS — F41.9 ANXIETY: ICD-10-CM

## 2023-07-25 DIAGNOSIS — G89.21 CHRONIC PAIN DUE TO TRAUMA: ICD-10-CM

## 2023-07-25 PROCEDURE — 3017F COLORECTAL CA SCREEN DOC REV: CPT | Performed by: PSYCHIATRY & NEUROLOGY

## 2023-07-25 PROCEDURE — G8427 DOCREV CUR MEDS BY ELIG CLIN: HCPCS | Performed by: PSYCHIATRY & NEUROLOGY

## 2023-07-25 PROCEDURE — 99214 OFFICE O/P EST MOD 30 MIN: CPT | Performed by: PSYCHIATRY & NEUROLOGY

## 2023-07-25 PROCEDURE — 1123F ACP DISCUSS/DSCN MKR DOCD: CPT | Performed by: PSYCHIATRY & NEUROLOGY

## 2023-07-25 RX ORDER — BUPROPION HYDROCHLORIDE 150 MG/1
150 TABLET ORAL EVERY MORNING
Qty: 30 TABLET | Refills: 2 | Status: SHIPPED | OUTPATIENT
Start: 2023-07-25

## 2023-07-25 RX ORDER — FLUOXETINE HYDROCHLORIDE 20 MG/1
20 CAPSULE ORAL EVERY MORNING
Qty: 30 CAPSULE | Refills: 2 | Status: SHIPPED | OUTPATIENT
Start: 2023-07-25

## 2023-07-25 NOTE — PROGRESS NOTES
Process:  Goal-Directed     Content: no evidence of psychosis    COGNITION Insight: Good    Judgement:  Intact    MEMORY  Intact    INTELLIGENCE  Average     Mobility/Gait: Independently     Controlled SubstancesMonitoring:   not done today      ASSESSMENT: Will wean off Elavil and start Prozac for mood, anxiety. Wellbutrin improved depression and mood swings. Diagnosis Orders   1. Current episode of major depressive disorder without prior episode, unspecified depression episode severity        2. Anxiety        3. Chronic pain due to trauma        4. Mood swings        R/o OUSMANE      PLAN:     Medications:   Decrease Elavil 50 mg QHS to 25 mg QHS x 2 days, then stop  Start Prozac 20 mg QAM - discussed r/b/se/a  Wellbutrin  mg QAM  Therapy: sees Dr. Ángela Ramirez  Labs/Tests/Imaging: none   Records Reviewed: [facility]  Patient advised to call if patient has any difficulties with treatment  Return in about 8 weeks (around 9/19/2023) for follow up, med check. Molly Carreon, was evaluated through a synchronous (real-time) audio-video encounter. The patient (or guardian if applicable) is aware that this is a billable service, which includes applicable co-pays. This Virtual Visit was conducted with patient's (and/or legal guardian's) consent. Patient identification was verified, and a caregiver was present when appropriate. The patient was located at Home: [facility] [place] Cisneros [address] [place] [address]  Provider was located at Home ([address] [facility]): [place]         Total time spent for this encounter: Not billed by time    --Kimberly Dover MD on 7/25/2023 at 10:37 AM    An electronic signature was used to authenticate this note.

## 2023-10-05 ENCOUNTER — TELEMEDICINE (OUTPATIENT)
Dept: PSYCHIATRY | Age: 68
End: 2023-10-05

## 2023-10-05 DIAGNOSIS — R45.86 MOOD SWINGS: ICD-10-CM

## 2023-10-05 DIAGNOSIS — G89.21 CHRONIC PAIN DUE TO TRAUMA: ICD-10-CM

## 2023-10-05 DIAGNOSIS — F41.9 ANXIETY: ICD-10-CM

## 2023-10-05 DIAGNOSIS — Z86.69 HISTORY OF OBSTRUCTIVE SLEEP APNEA: ICD-10-CM

## 2023-10-05 DIAGNOSIS — F32.9 CURRENT EPISODE OF MAJOR DEPRESSIVE DISORDER WITHOUT PRIOR EPISODE, UNSPECIFIED DEPRESSION EPISODE SEVERITY: Primary | ICD-10-CM

## 2023-10-05 RX ORDER — BUPROPION HYDROCHLORIDE 150 MG/1
150 TABLET ORAL EVERY MORNING
Qty: 30 TABLET | Refills: 3 | Status: SHIPPED | OUTPATIENT
Start: 2023-10-05

## 2023-10-05 RX ORDER — AMITRIPTYLINE HYDROCHLORIDE 50 MG/1
50 TABLET, FILM COATED ORAL NIGHTLY
Qty: 30 TABLET | Refills: 3 | Status: SHIPPED | OUTPATIENT
Start: 2023-10-05

## 2023-10-05 NOTE — PROGRESS NOTES
[address] PSYCHIATRY  [address] [facility] [address]  [facility] [id number] [phone]    Progress Note    Patient:  Homero Trinh  YOB: 1955  PCP:  EHSAN Childress CNP  Visit Date:  10/5/2023      Chief Complaint   Patient presents with    Follow-up    Medication Check    Depression    Anxiety    Mood Swings    Chronic Pain       SUBJECTIVE:      Homero Trinh, a 79 y.o. male, presents for a follow up visit. He presents alone. Mood is fair, stable. Trying to stay busy doing multiple house projects. Preparing the house for winter. Bad HA this morning and had to lay down. Took Excedrin which is helping. Couldn't tolerate Prozac d/t CP and HA. Stopped it after 4 days and returned to taking Elavil which helps mood and not sleep. Sleep always fragmented. Goes to bed around 9:30pm. Sleeps a few hours. Might be up a few hours and watch a movie then back to sleep. Had PSG several years ago that dx ARYA and was supposed to go back to do CPAP titration and never went. Cites financial stress. He is agreeable to another sleep center referral. I'm concerned his ARYA is negatively impacting his health and may be causing his HA. No SI or psychosis. Declines to make any further med changes today. Med Trials:Cymbalta (fatigue? No benefit), Elavil, trazodone, Ambien, Wellbutrin, Zoloft, Paxil, Prozac (HA, anxiety, CP)    OBJECTIVE:  Vitals: There were no vitals taken for this visit. MENTAL STATUS EXAM:    GENERAL  Build: Overweight    Hygiene:  Appropriate    SENSORIUM Orientation: Place, Person, Time, & Situation     Consciousness: Alert    ATTENTION   Focused    RELATEDNESS  Cooperative    EYE CONTACT   Good    PSYCHOMOTOR  Normal    SPEECH Volume: Normal     Rate: Normal rate and tone    Amplitude:  Within normal limits   MOOD  Euthymic   AFFECT Range: limited , mood congruent   THOUGHT Process:  Goal-Directed     Content:

## 2024-02-21 ENCOUNTER — TELEMEDICINE (OUTPATIENT)
Dept: PSYCHIATRY | Age: 69
End: 2024-02-21
Payer: COMMERCIAL

## 2024-02-21 DIAGNOSIS — F32.9 CURRENT EPISODE OF MAJOR DEPRESSIVE DISORDER WITHOUT PRIOR EPISODE, UNSPECIFIED DEPRESSION EPISODE SEVERITY: Primary | ICD-10-CM

## 2024-02-21 DIAGNOSIS — G89.21 CHRONIC PAIN DUE TO TRAUMA: ICD-10-CM

## 2024-02-21 DIAGNOSIS — F41.9 ANXIETY: ICD-10-CM

## 2024-02-21 DIAGNOSIS — R45.86 MOOD SWINGS: ICD-10-CM

## 2024-02-21 PROCEDURE — 99213 OFFICE O/P EST LOW 20 MIN: CPT | Performed by: PSYCHIATRY & NEUROLOGY

## 2024-02-21 PROCEDURE — 1123F ACP DISCUSS/DSCN MKR DOCD: CPT | Performed by: PSYCHIATRY & NEUROLOGY

## 2024-02-21 RX ORDER — BUPROPION HYDROCHLORIDE 150 MG/1
150 TABLET ORAL EVERY MORNING
Qty: 30 TABLET | Refills: 3 | Status: SHIPPED | OUTPATIENT
Start: 2024-02-21

## 2024-02-21 RX ORDER — AMITRIPTYLINE HYDROCHLORIDE 50 MG/1
50 TABLET, FILM COATED ORAL NIGHTLY
Qty: 30 TABLET | Refills: 3 | Status: SHIPPED | OUTPATIENT
Start: 2024-02-21

## 2024-02-21 NOTE — PROGRESS NOTES
[facility] PHYSICIANS [place] SPECIALTY  [facility] PSYCHIATRY  [address] [facility] [id number] [phone]    Progress Note    Patient:  Evans Page  YOB: 1955  PCP:  Felicitas Lara APRN - CNP  Visit Date:  2/21/2024      Chief Complaint   Patient presents with    Follow-up    Medication Check    Depression    Anxiety    Chronic Pain       SUBJECTIVE:      Evans Page, a 68 y.o. male, presents for a follow up visit.     He presents with his wife in the background.  \"I feel good.\"  Has been busy working in the yard. Had a tree come down with [employer]. Cut that up and burning the stump. Cleaning brush. Feels good to be getting outdoors.   Mood \"pretty good\".   Through winter stayed home mostly noting cold weather keeps him in.   Denies much anxiety. Had more anxiety with cabin fever.   D/t chronic pain issues paces himself when outside.   Bought a new mattress and sleeping better. Not getting up through the night as often.   Cancelled sleep center appointment.   Pain Mgmt increased Lyrica. Planning for MRI of the spine.   Hasn't had any HA recently.   Declines to make any medication changes today.   No SI or psychosis.      Med Trials:Cymbalta (fatigue? No benefit), Elavil, trazodone, Ambien, Wellbutrin, Zoloft, Paxil, Prozac (HA, anxiety, CP)    OBJECTIVE:  Vitals: There were no vitals taken for this visit.    MENTAL STATUS EXAM:    GENERAL  Build: Overweight    Hygiene:  Appropriate in casual dress   SENSORIUM Orientation: Place, Person, Time, & Situation     Consciousness: Alert    ATTENTION   Focused    RELATEDNESS  Cooperative    EYE CONTACT   Good    PSYCHOMOTOR  Normal    SPEECH Volume: Normal     Rate: Normal rate and upbeat tone    Amplitude: Within normal limits   MOOD  \"Pretty good\"   AFFECT Range: Full, mood congruent   THOUGHT Process:  Goal-Directed     Content: no evidence of psychosis    COGNITION Insight: Good    Judgement:  Intact

## 2024-04-30 ENCOUNTER — HOSPITAL ENCOUNTER (EMERGENCY)
Age: 69
Discharge: HOME | End: 2024-04-30
Attending: EMERGENCY MEDICINE
Payer: COMMERCIAL

## 2024-04-30 VITALS
HEART RATE: 88 BPM | SYSTOLIC BLOOD PRESSURE: 120 MMHG | DIASTOLIC BLOOD PRESSURE: 94 MMHG | OXYGEN SATURATION: 100 % | RESPIRATION RATE: 19 BRPM | TEMPERATURE: 97.7 F

## 2024-04-30 DIAGNOSIS — L03.115 CELLULITIS OF RIGHT LOWER EXTREMITY: Primary | ICD-10-CM

## 2024-04-30 LAB
ANION GAP SERPL CALC-SCNC: 11 MEQ/L (ref 8–16)
BASOPHILS ABSOLUTE: 0.1 THOU/MM3 (ref 0–0.1)
BASOPHILS NFR BLD AUTO: 0.7 %
BUN SERPL-MCNC: 12 MG/DL (ref 7–22)
CALCIUM SERPL-MCNC: 9.2 MG/DL (ref 8.5–10.5)
CHLORIDE SERPL-SCNC: 99 MEQ/L (ref 98–111)
CO2 SERPL-SCNC: 25 MEQ/L (ref 23–33)
CREAT SERPL-MCNC: 0.7 MG/DL (ref 0.4–1.2)
DEPRECATED RDW RBC AUTO: 41.4 FL (ref 35–45)
EOSINOPHIL NFR BLD AUTO: 2.7 %
EOSINOPHILS ABSOLUTE: 0.3 THOU/MM3 (ref 0–0.4)
ERYTHROCYTE [DISTWIDTH] IN BLOOD BY AUTOMATED COUNT: 12.7 % (ref 11.5–14.5)
ERYTHROCYTE [SEDIMENTATION RATE] IN BLOOD BY WESTERGREN METHOD: 20 MM/HR (ref 0–10)
GFR SERPL CREATININE-BSD FRML MDRD: > 90 ML/MIN/1.73M2
GLUCOSE SERPL-MCNC: 355 MG/DL (ref 70–108)
HCT VFR BLD AUTO: 44.4 % (ref 42–52)
HGB BLD-MCNC: 15.2 GM/DL (ref 14–18)
IMM GRANULOCYTES # BLD AUTO: 0.11 THOU/MM3 (ref 0–0.07)
IMM GRANULOCYTES NFR BLD AUTO: 1 %
LYMPHOCYTES ABSOLUTE: 3.5 THOU/MM3 (ref 1–4.8)
LYMPHOCYTES NFR BLD AUTO: 32.2 %
MCH RBC QN AUTO: 30.5 PG (ref 26–33)
MCHC RBC AUTO-ENTMCNC: 34.2 GM/DL (ref 32.2–35.5)
MCV RBC AUTO: 89.2 FL (ref 80–94)
MONOCYTES ABSOLUTE: 1 THOU/MM3 (ref 0.4–1.3)
MONOCYTES NFR BLD AUTO: 9.5 %
NEUTROPHILS NFR BLD AUTO: 53.9 %
NRBC BLD AUTO-RTO: 0 /100 WBC
OSMOLALITY SERPL CALC.SUM OF ELEC: 284.1 MOSMOL/KG (ref 275–300)
PLATELET # BLD AUTO: 219 THOU/MM3 (ref 130–400)
PMV BLD AUTO: 10.6 FL (ref 9.4–12.4)
POTASSIUM SERPL-SCNC: 4.2 MEQ/L (ref 3.5–5.2)
PROCALCITONIN SERPL IA-MCNC: 0.04 NG/ML (ref 0.01–0.09)
RBC # BLD AUTO: 4.98 MILL/MM3 (ref 4.7–6.1)
SEGMENTED NEUTROPHILS ABSOLUTE COUNT: 5.8 THOU/MM3 (ref 1.8–7.7)
SODIUM SERPL-SCNC: 135 MEQ/L (ref 135–145)
WBC # BLD AUTO: 10.8 THOU/MM3 (ref 4.8–10.8)

## 2024-04-30 PROCEDURE — 84145 PROCALCITONIN (PCT): CPT

## 2024-04-30 PROCEDURE — 80048 BASIC METABOLIC PNL TOTAL CA: CPT

## 2024-04-30 PROCEDURE — 85651 RBC SED RATE NONAUTOMATED: CPT

## 2024-04-30 PROCEDURE — 99283 EMERGENCY DEPT VISIT LOW MDM: CPT

## 2024-04-30 PROCEDURE — 36415 COLL VENOUS BLD VENIPUNCTURE: CPT

## 2024-04-30 PROCEDURE — 85025 COMPLETE CBC W/AUTO DIFF WBC: CPT

## 2024-04-30 RX ORDER — AMOXICILLIN AND CLAVULANATE POTASSIUM 875; 125 MG/1; MG/1
1 TABLET, FILM COATED ORAL 2 TIMES DAILY
Qty: 20 TABLET | Refills: 0 | Status: SHIPPED | OUTPATIENT
Start: 2024-04-30 | End: 2024-05-10

## 2024-04-30 ASSESSMENT — PAIN - FUNCTIONAL ASSESSMENT: PAIN_FUNCTIONAL_ASSESSMENT: 0-10

## 2024-04-30 ASSESSMENT — PAIN SCALES - GENERAL: PAINLEVEL_OUTOF10: 4

## 2024-04-30 NOTE — DISCHARGE INSTRUCTIONS
Take 1 tablet twice a day for 10 day   Follow up with Family medicine clinic as scheduled  Monitor sugars over the next week prior to appointment

## 2024-04-30 NOTE — ED PROVIDER NOTES
[facility] EMERGENCY DEPT  EMERGENCY DEPARTMENT ENCOUNTER          Pt Name: Evans Page  MRN: [id number]  Birthdate 1955  Date of evaluation: 4/30/2024  Physician: Tadeo Ball MD  Supervising Attending Physician: Randolph Alcantar DO       CHIEF COMPLAINT       Chief Complaint   Patient presents with    Leg Pain       HISTORY OF PRESENT ILLNESS    HPI  Evans Page is a 68 y.o. male who presents to the emergency department from home, by private vehicle with wife for evaluation of right lower leg swelling and redness. Patient states symptoms started about 1 week ago. Patient denies any acute trauma or change in habits. Of note patient had a remote history of cellulitis needing hospitalization and IV antibiotics via PICC line. Patient also had a remote history of DVT completed therapy with anticoagulation. Due to concerns patient wanted to get evaluated sooner rather than later.   Patient endorses redness, swelling and burning sensation at the right lower extremity.   Denies any fever, chills, chest pain, abdominal pain, nausea, vomiting, diarrhea, and constipation     The patient has no other acute complaints at this time.      REVIEW OF SYSTEMS   Review of Systems   Constitutional:  Negative for chills, diaphoresis and fever.   HENT:  Negative for congestion.    Respiratory:  Positive for shortness of breath. Negative for cough, chest tightness and wheezing.    Cardiovascular:  Positive for leg swelling. Negative for chest pain.   Gastrointestinal:  Negative for abdominal pain, constipation, diarrhea, nausea and vomiting.   Musculoskeletal:  Negative for arthralgias and myalgias.   Skin:  Positive for color change (poorly demarcated) and rash.   Neurological:  Negative for syncope, light-headedness and headaches.   Psychiatric/Behavioral: Negative.         PAST MEDICAL AND SURGICAL HISTORY     Past Medical History:   Diagnosis Date    Asthma     CHF (congestive heart failure)

## 2024-05-01 ASSESSMENT — ENCOUNTER SYMPTOMS
ABDOMINAL PAIN: 0
DIARRHEA: 0
VOMITING: 0
NAUSEA: 0
SHORTNESS OF BREATH: 1
COLOR CHANGE: 1
CONSTIPATION: 0
COUGH: 0
CHEST TIGHTNESS: 0
WHEEZING: 0

## 2024-05-07 ENCOUNTER — OFFICE VISIT (OUTPATIENT)
Dept: FAMILY MEDICINE CLINIC | Age: 69
End: 2024-05-07

## 2024-05-07 VITALS
OXYGEN SATURATION: 98 % | WEIGHT: 315 LBS | DIASTOLIC BLOOD PRESSURE: 78 MMHG | RESPIRATION RATE: 22 BRPM | TEMPERATURE: 97.9 F | HEART RATE: 77 BPM | SYSTOLIC BLOOD PRESSURE: 138 MMHG | BODY MASS INDEX: 46.65 KG/M2 | HEIGHT: 69 IN

## 2024-05-07 DIAGNOSIS — Z13.220 LIPID SCREENING: ICD-10-CM

## 2024-05-07 DIAGNOSIS — F41.9 ANXIETY: ICD-10-CM

## 2024-05-07 DIAGNOSIS — L03.119 CELLULITIS OF LOWER EXTREMITY, UNSPECIFIED LATERALITY: ICD-10-CM

## 2024-05-07 DIAGNOSIS — E11.65 TYPE 2 DIABETES MELLITUS WITH HYPERGLYCEMIA, WITHOUT LONG-TERM CURRENT USE OF INSULIN (HCC): Primary | ICD-10-CM

## 2024-05-07 DIAGNOSIS — F33.1 MODERATE EPISODE OF RECURRENT MAJOR DEPRESSIVE DISORDER (HCC): ICD-10-CM

## 2024-05-07 DIAGNOSIS — Z11.59 ENCOUNTER FOR HEPATITIS C SCREENING TEST FOR LOW RISK PATIENT: ICD-10-CM

## 2024-05-07 LAB
HBA1C MFR BLD: 11.9 %
HBA1C MFR BLD: 11.9 % (ref 4.3–5.7)
MICROALB/CREAT RATIO POC: ABNORMAL MG/G
MICROALBUMIN/CREAT UR-RTO: 80 MG/L
POC CREATININE, URINE: 100 MG/DL

## 2024-05-07 RX ORDER — SEMAGLUTIDE 1.34 MG/ML
0.25 INJECTION, SOLUTION SUBCUTANEOUS WEEKLY
Qty: 1 ADJUSTABLE DOSE PRE-FILLED PEN SYRINGE | Refills: 0 | Status: SHIPPED | OUTPATIENT
Start: 2024-05-07 | End: 2024-06-04

## 2024-05-07 SDOH — ECONOMIC STABILITY: FOOD INSECURITY: WITHIN THE PAST 12 MONTHS, YOU WORRIED THAT YOUR FOOD WOULD RUN OUT BEFORE YOU GOT MONEY TO BUY MORE.: NEVER TRUE

## 2024-05-07 SDOH — ECONOMIC STABILITY: HOUSING INSECURITY
IN THE LAST 12 MONTHS, WAS THERE A TIME WHEN YOU DID NOT HAVE A STEADY PLACE TO SLEEP OR SLEPT IN A SHELTER (INCLUDING NOW)?: NO

## 2024-05-07 SDOH — ECONOMIC STABILITY: INCOME INSECURITY: HOW HARD IS IT FOR YOU TO PAY FOR THE VERY BASICS LIKE FOOD, HOUSING, MEDICAL CARE, AND HEATING?: NOT HARD AT ALL

## 2024-05-07 SDOH — ECONOMIC STABILITY: FOOD INSECURITY: WITHIN THE PAST 12 MONTHS, THE FOOD YOU BOUGHT JUST DIDN'T LAST AND YOU DIDN'T HAVE MONEY TO GET MORE.: NEVER TRUE

## 2024-05-07 ASSESSMENT — ENCOUNTER SYMPTOMS
SHORTNESS OF BREATH: 0
ABDOMINAL PAIN: 0

## 2024-05-07 ASSESSMENT — PATIENT HEALTH QUESTIONNAIRE - PHQ9
5. POOR APPETITE OR OVEREATING: NOT AT ALL
6. FEELING BAD ABOUT YOURSELF - OR THAT YOU ARE A FAILURE OR HAVE LET YOURSELF OR YOUR FAMILY DOWN: NOT AT ALL
4. FEELING TIRED OR HAVING LITTLE ENERGY: SEVERAL DAYS
SUM OF ALL RESPONSES TO PHQ9 QUESTIONS 1 & 2: 0
9. THOUGHTS THAT YOU WOULD BE BETTER OFF DEAD, OR OF HURTING YOURSELF: NOT AT ALL
1. LITTLE INTEREST OR PLEASURE IN DOING THINGS: NOT AT ALL
SUM OF ALL RESPONSES TO PHQ QUESTIONS 1-9: 2
SUM OF ALL RESPONSES TO PHQ QUESTIONS 1-9: 2
10. IF YOU CHECKED OFF ANY PROBLEMS, HOW DIFFICULT HAVE THESE PROBLEMS MADE IT FOR YOU TO DO YOUR WORK, TAKE CARE OF THINGS AT HOME, OR GET ALONG WITH OTHER PEOPLE: NOT DIFFICULT AT ALL
SUM OF ALL RESPONSES TO PHQ QUESTIONS 1-9: 2
3. TROUBLE FALLING OR STAYING ASLEEP: NOT AT ALL
2. FEELING DOWN, DEPRESSED OR HOPELESS: NOT AT ALL
SUM OF ALL RESPONSES TO PHQ QUESTIONS 1-9: 2
7. TROUBLE CONCENTRATING ON THINGS, SUCH AS READING THE NEWSPAPER OR WATCHING TELEVISION: SEVERAL DAYS

## 2024-05-07 NOTE — PROGRESS NOTES
Patient:Evans Page  YOB: 1955  MRN: [id number]    Subjective   69 y.o. male who presents for the following: ED follow-up of cellulitis of the leg.    HPI  Mr. Page is a 70 yo male who presents for an ED follow-up. He went to [facility] ED on 5/4 for right lower leg swelling and redness for about 1 week. He has a history of cellulitis requiring hospitalization and IV antibiotics, as well as a history of DVT. In the ED, he was given a prescription for Augmentin for 10 days. He states his leg is still burning but the redness has improved. He still has 4 or 5 days left of the Augmentin.    In the ED, labs showed a glucose of 355, likely secondary to uncontrolled diabetes, for which he has not filled his medications for since 2022. POC A1c today was 11.9%. He states he has been taking Metformin twice a day but not taking glipizide anymore.   Review of Systems   Review of Systems  Gen: -fatigue, -appetite changes  CV: -chest pain, -palpitations  Resp: -SOB, -cough  Skin: +burning in R leg, +redness on R leg    Patient History    Past Medical History:  He has a past medical history of Asthma, CHF (congestive heart failure) (HCC), Chronic back pain, Chronic neck pain, and Obesity., type 2 diabetes mellitus    Social History:  He reports that he quit smoking about 7 years ago. His smoking use included cigarettes. He started smoking about 49 years ago. He has a 21.4 pack-year smoking history. He has never used smokeless tobacco. He reports current alcohol use. He reports that he does not use drugs.     Past Surgical History:   Procedure Laterality Date    COLONOSCOPY  4/17/15    OTHER SURGICAL HISTORY      Had face surgery at 15     ROTATOR CUFF REPAIR Left 2007    Dr. Plasencia      Family History   Problem Relation Age of Onset    Arthritis Father     Stroke Father     Heart Attack Father      Objective     Vitals:    05/07/24 1022 05/07/24 1031   BP: (!) 146/76 138/78   Site: Right Lower Arm Left Lower 
I reviewed with the resident the medical history and the resident's findings on the physical examination.  I discussed with the resident the patient's diagnosis and concur with the plan. GE Modifier added.  
demonstrated proteinuria, will need ACE/ARB  - TSH With Reflex Ft4; Future  - POCT glycosylated hemoglobin (Hb A1C)  - POCT microalbumin  - Semaglutide,0.25 or 0.5MG/DOS, (OZEMPIC, 0.25 OR 0.5 MG/DOSE,) 2 MG/1.5ML SOPN; Inject 0.25 mg into the skin once a week for 28 days  Dispense: 1 Adjustable Dose Pre-filled Pen Syringe; Refill: 0    2. Anxiety  3. Moderate episode of recurrent major depressive disorder (HCC)  -Chronic, controlled, continue to follow with psychiatry, continue current medications    4. Lipid screening  - Lipid, Fasting; Future    5. Encounter for hepatitis C screening test for low risk patient  - Hepatitis C Antibody; Future    6. Cellulitis of lower extremity, unspecified laterality  -Was seen in the emergency department regarding cellulitis, has improved, continues to take Augmentin twice daily for 10 days  - Having minimal burning sensation, will send in Voltaren gel to help with any inflammation  - diclofenac sodium (VOLTAREN) 1 % GEL; Apply 4 g topically 4 times daily  Dispense: 100 g; Refill: 1    Overall patient was needing to leave appointment slightly early secondary to another doctor's appointment 30 to 45 minutes away.  Hemoglobin A1c demonstrated 11.9%, patient to continue on his metformin 1000 mg twice daily, will plan to start Ozempic at this timeframe, discussed side effect profile and any red flag symptoms that might arise.  Point-of-care microalbumin demonstrated proteinuria, will need ACE/ARB at next visit and start on statin.  Other blood work not tested for an emergency department ordered at this time, patient is to complete before I see him again.  Patient would likely benefit from pharmacy visit for diabetes management and medication management.  Continue on Augmentin for cellulitis and Voltaren gel sent in for increased burning sensation for any sort of inflammation that may be causing the sensation.    No follow-ups on file.    Orders Placed   Orders Placed This Encounter

## 2024-05-22 ENCOUNTER — OFFICE VISIT (OUTPATIENT)
Dept: FAMILY MEDICINE CLINIC | Age: 69
End: 2024-05-22

## 2024-05-22 VITALS
SYSTOLIC BLOOD PRESSURE: 136 MMHG | HEIGHT: 69 IN | BODY MASS INDEX: 46.65 KG/M2 | HEART RATE: 77 BPM | WEIGHT: 315 LBS | DIASTOLIC BLOOD PRESSURE: 68 MMHG | TEMPERATURE: 97.7 F | RESPIRATION RATE: 22 BRPM | OXYGEN SATURATION: 96 %

## 2024-05-22 DIAGNOSIS — M79.89 RIGHT LEG SWELLING: Primary | ICD-10-CM

## 2024-05-22 DIAGNOSIS — Z86.718 HX OF DEEP VENOUS THROMBOSIS: ICD-10-CM

## 2024-05-22 DIAGNOSIS — R21 RASH AND NONSPECIFIC SKIN ERUPTION: ICD-10-CM

## 2024-05-22 DIAGNOSIS — Z86.711 HX OF PULMONARY EMBOLUS: ICD-10-CM

## 2024-05-22 DIAGNOSIS — L03.115 CELLULITIS OF RIGHT LOWER EXTREMITY: ICD-10-CM

## 2024-05-22 DIAGNOSIS — R60.9 DEPENDENT EDEMA: ICD-10-CM

## 2024-05-22 RX ORDER — TRIAMCINOLONE ACETONIDE 1 MG/G
CREAM TOPICAL
Qty: 45 G | Refills: 0 | Status: SHIPPED | OUTPATIENT
Start: 2024-05-22

## 2024-05-22 NOTE — PROGRESS NOTES
Attending Physician Note    I saw and evaluated the patient, performing the key elements of the service.  I discussed the findings, assessment and plan with the resident and agree with the resident's findings and plan as documented in the resident's note.  GC modifier added.    Brief summary:  RLE edema - hx DVT.  Check duplex.  Cellulitis has shown some improvement, but still with erythema.  Close follow up - consider repeat CBC, ESR, CRP to help evaluate for ongoing infection.   Scattered areas of excoriation BUE, present chronically - trial medium potency topical steroid.

## 2024-05-24 ENCOUNTER — HOSPITAL ENCOUNTER (OUTPATIENT)
Dept: INTERVENTIONAL RADIOLOGY/VASCULAR | Age: 69
Discharge: HOME | End: 2024-05-24
Payer: COMMERCIAL

## 2024-05-24 DIAGNOSIS — Z86.718 HX OF DEEP VENOUS THROMBOSIS: ICD-10-CM

## 2024-05-24 DIAGNOSIS — M79.89 RIGHT LEG SWELLING: ICD-10-CM

## 2024-05-24 DIAGNOSIS — Z86.711 HX OF PULMONARY EMBOLUS: ICD-10-CM

## 2024-05-24 DIAGNOSIS — R60.9 DEPENDENT EDEMA: ICD-10-CM

## 2024-05-24 PROCEDURE — 93971 EXTREMITY STUDY: CPT

## 2024-06-07 ENCOUNTER — OFFICE VISIT (OUTPATIENT)
Dept: FAMILY MEDICINE CLINIC | Age: 69
End: 2024-06-07

## 2024-06-07 VITALS
DIASTOLIC BLOOD PRESSURE: 70 MMHG | RESPIRATION RATE: 20 BRPM | HEART RATE: 84 BPM | OXYGEN SATURATION: 97 % | HEIGHT: 69 IN | WEIGHT: 315 LBS | TEMPERATURE: 98.4 F | SYSTOLIC BLOOD PRESSURE: 132 MMHG | BODY MASS INDEX: 46.65 KG/M2

## 2024-06-07 DIAGNOSIS — R60.9 DEPENDENT EDEMA: ICD-10-CM

## 2024-06-07 DIAGNOSIS — M79.89 RIGHT LEG SWELLING: ICD-10-CM

## 2024-06-07 DIAGNOSIS — L03.115 CELLULITIS OF RIGHT LOWER EXTREMITY: Primary | ICD-10-CM

## 2024-06-07 RX ORDER — DOXYCYCLINE HYCLATE 100 MG
100 TABLET ORAL 2 TIMES DAILY
Qty: 20 TABLET | Refills: 0 | Status: SHIPPED | OUTPATIENT
Start: 2024-06-07 | End: 2024-06-17

## 2024-06-07 ASSESSMENT — ENCOUNTER SYMPTOMS
VOMITING: 0
NAUSEA: 0

## 2024-06-07 NOTE — PROGRESS NOTES
Health Maintenance Due   Topic Date Due    COVID-19 Vaccine (1) Never done    Diabetic foot exam  Never done    Diabetic retinal exam  Never done    Hepatitis C screen  Never done    DTaP/Tdap/Td vaccine (1 - Tdap) Never done    Shingles vaccine (1 of 2) Never done    Low dose CT lung screening &/or counseling  Never done    Respiratory Syncytial Virus (RSV) Pregnant or age 60 yrs+ (1 - 1-dose 60+ series) Never done    Pneumococcal 65+ years Vaccine (2 of 2 - PCV) 10/10/2019    Colorectal Cancer Screen  07/12/2020    Lipids  10/03/2020    AAA screen  Never done    Annual Wellness Visit (Medicare)  11/27/2023

## 2024-06-07 NOTE — PROGRESS NOTES
[facility] PROFESSIONAL [facility] MEDICINE PRACTICE  [address]  [place] [id number]  Dept: [phone]  Dept Fax: [phone]  Loc: [phone]      Evans Page is a 69 y.o. male who presents today for:  Chief Complaint   Patient presents with    Follow-up     1 week       HPI:   Evans Page is 69 y.o. presents today for follow up.    States that the rash on right lower leg has been improving, erythema has improved but hurts to bend foot. Edema has also improved. Pain in foot is the same as has been, no change in that. No fevers. Had been applying a kenalog cream briefly, does not think that helped. Rash does itch at times. Has had swelling for years. Bumex typically helps with the edema.       Objective:     Vitals:    06/07/24 1026   BP: 132/70   Pulse: 84   Resp: 20   Temp: 98.4 °F (36.9 °C)   SpO2: 97%         Wt Readings from Last 3 Encounters:   06/07/24 (!) 158.7 kg (349 lb 12.8 oz)   05/22/24 (!) 162.8 kg (358 lb 12.8 oz)   05/07/24 (!) 163.9 kg (361 lb 6.4 oz)       BP Readings from Last 3 Encounters:   06/07/24 132/70   05/22/24 136/68   05/07/24 138/78       Lab Results   Component Value Date    WBC 10.8 04/30/2024    HGB 15.2 04/30/2024    HCT 44.4 04/30/2024    MCV 89.2 04/30/2024     04/30/2024     Lab Results   Component Value Date     04/30/2024    K 4.2 04/30/2024    CL 99 04/30/2024    CO2 25 04/30/2024    BUN 12 04/30/2024    CREATININE 0.7 04/30/2024    GLUCOSE 355 (H) 04/30/2024    CALCIUM 9.2 04/30/2024    BILITOT 0.5 10/03/2019    ALKPHOS 129 10/03/2019    AST 17 10/03/2019    ALT 26 10/03/2019    LABGLOM >90 04/30/2024     Lab Results   Component Value Date    TSH 2.44 06/25/2018     Lab Results   Component Value Date    LABA1C 11.9 (H) 05/07/2024     Lab Results   Component Value Date     (H) 01/29/2018     Lab Results   Component Value Date    CHOL 190 10/03/2019     Lab Results   Component Value Date    TRIG 146

## 2024-06-08 LAB
ANTIBODY: NON REACTIVE
CHOLESTEROL, FASTING: 189
HDLC SERPL-MCNC: 40 MG/DL (ref 35–70)
LDL CHOLESTEROL CALCULATED: 113 MG/DL (ref 0–160)
TRIGLYCERIDE, FASTING: 178
TSH SERPL DL<=0.05 MIU/L-ACNC: 2.54 UIU/ML

## 2024-06-11 ENCOUNTER — TELEPHONE (OUTPATIENT)
Dept: FAMILY MEDICINE CLINIC | Age: 69
End: 2024-06-11

## 2024-06-11 NOTE — TELEPHONE ENCOUNTER
----- Message from Jose Reynoso DO sent at 6/11/2024 12:00 PM EDT -----  Labs reviewed and normal, no concerns. Thank you

## 2024-06-14 NOTE — TELEPHONE ENCOUNTER
Spoke with Yarely Grullon and let her know what there was no infection in the blood. Yarely Grullon stated understanding. flank pain

## 2024-06-26 DIAGNOSIS — L03.119 CELLULITIS OF LOWER EXTREMITY, UNSPECIFIED LATERALITY: ICD-10-CM

## 2024-06-26 NOTE — TELEPHONE ENCOUNTER
Patient's last appointment was : 6/7/2024 with our   Patient's next appointment is : 6/28/2024 with our Dr. Reynoso  Last refilled on: 05/07/2024  Which pharmacy does the script need sent to: [facility]      Lab Results   Component Value Date    LABA1C 11.9 (H) 05/07/2024     Lab Results   Component Value Date    CHOL 190 10/03/2019    TRIG 146 10/03/2019    HDL 40 06/08/2024     Lab Results   Component Value Date     04/30/2024    K 4.2 04/30/2024    CL 99 04/30/2024    CO2 25 04/30/2024    BUN 12 04/30/2024    CREATININE 0.7 04/30/2024    GLUCOSE 355 (H) 04/30/2024    CALCIUM 9.2 04/30/2024    BILITOT 0.5 10/03/2019    ALKPHOS 129 10/03/2019    AST 17 10/03/2019    ALT 26 10/03/2019    LABGLOM >90 04/30/2024     Lab Results   Component Value Date    TSH 2.540 06/08/2024     Lab Results   Component Value Date    WBC 10.8 04/30/2024    HGB 15.2 04/30/2024    HCT 44.4 04/30/2024    MCV 89.2 04/30/2024     04/30/2024

## 2024-06-28 ENCOUNTER — OFFICE VISIT (OUTPATIENT)
Dept: FAMILY MEDICINE CLINIC | Age: 69
End: 2024-06-28

## 2024-06-28 VITALS
OXYGEN SATURATION: 93 % | SYSTOLIC BLOOD PRESSURE: 138 MMHG | RESPIRATION RATE: 16 BRPM | WEIGHT: 315 LBS | HEIGHT: 69 IN | BODY MASS INDEX: 46.65 KG/M2 | TEMPERATURE: 97.8 F | HEART RATE: 80 BPM | DIASTOLIC BLOOD PRESSURE: 86 MMHG

## 2024-06-28 DIAGNOSIS — F33.1 MODERATE EPISODE OF RECURRENT MAJOR DEPRESSIVE DISORDER (HCC): ICD-10-CM

## 2024-06-28 DIAGNOSIS — R60.0 LOWER EXTREMITY EDEMA: ICD-10-CM

## 2024-06-28 DIAGNOSIS — F41.9 ANXIETY: ICD-10-CM

## 2024-06-28 DIAGNOSIS — L03.119 CELLULITIS OF LOWER EXTREMITY, UNSPECIFIED LATERALITY: Primary | ICD-10-CM

## 2024-06-28 DIAGNOSIS — E11.65 TYPE 2 DIABETES MELLITUS WITH HYPERGLYCEMIA, WITHOUT LONG-TERM CURRENT USE OF INSULIN (HCC): ICD-10-CM

## 2024-06-28 DIAGNOSIS — E11.9 TYPE 2 DIABETES MELLITUS WITHOUT COMPLICATION, WITHOUT LONG-TERM CURRENT USE OF INSULIN (HCC): ICD-10-CM

## 2024-06-28 RX ORDER — LANCETS 30 GAUGE
1 EACH MISCELLANEOUS 2 TIMES DAILY
Qty: 300 EACH | Refills: 1 | Status: SHIPPED | OUTPATIENT
Start: 2024-06-28

## 2024-06-28 RX ORDER — GLUCOSAMINE HCL/CHONDROITIN SU 500-400 MG
CAPSULE ORAL
Qty: 100 STRIP | Refills: 3 | Status: SHIPPED | OUTPATIENT
Start: 2024-06-28

## 2024-06-28 RX ORDER — SEMAGLUTIDE 0.68 MG/ML
0.5 INJECTION, SOLUTION SUBCUTANEOUS WEEKLY
Qty: 3 ML | Refills: 0 | Status: SHIPPED | OUTPATIENT
Start: 2024-06-28 | End: 2024-07-26

## 2024-06-28 RX ORDER — BLOOD-GLUCOSE METER
1 KIT MISCELLANEOUS DAILY PRN
Qty: 1 KIT | Refills: 0 | Status: SHIPPED | OUTPATIENT
Start: 2024-06-28

## 2024-06-28 ASSESSMENT — ENCOUNTER SYMPTOMS
SHORTNESS OF BREATH: 0
ABDOMINAL PAIN: 0

## 2024-06-28 NOTE — PROGRESS NOTES
I saw and evaluated the patient, performing the key elements of the service.  I discussed the findings, assessment and plan with the resident and agree with the resident's findings and plan as documented in the resident's note. GC modifier added.  
kit with supplies to continue monitoring sugars at home  - Patient would likely benefit from an SGLT2 inhibitor in the future, however we will proceed with echocardiogram for secondary to lower extremity edema  - Semaglutide,0.25 or 0.5MG/DOS, (OZEMPIC, 0.25 OR 0.5 MG/DOSE,) 2 MG/3ML SOPN; Inject 0.5 mg as directed once a week for 28 days  Dispense: 3 mL; Refill: 0  - metFORMIN (GLUCOPHAGE) 1000 MG tablet; Take 1 tablet by mouth 2 times daily (with meals)  Dispense: 180 tablet; Refill: 3  - Lancets MISC; 1 each by Does not apply route 2 times daily  Dispense: 300 each; Refill: 1  - blood glucose monitor strips; Use when checking sugars  Dispense: 100 strip; Refill: 3  - glucose monitoring kit; 1 kit by Does not apply route daily as needed (Check sugars daily)  Dispense: 1 kit; Refill: 0    3. Anxiety  4. Moderate episode of recurrent major depressive disorder (HCC)  -Chronic, controlled, continue to follow with psychiatry    5. Type 2 diabetes mellitus without complication, without long-term current use of insulin (HCC)  - metFORMIN (GLUCOPHAGE) 1000 MG tablet; Take 1 tablet by mouth 2 times daily (with meals)  Dispense: 180 tablet; Refill: 3    6. Body mass index (BMI) 50.0-59.9, adult ([facility])    7. Lower extremity edema  -Chronic, on Bumex for approximately 5 years  - Patient would benefit from an echocardiogram to ensure there is no heart failure related issues with lower extremity edema, proceed forward with echocardiogram as below  - Pending results patient would likely benefit from SGLT2 and Imdur  - Echo (TTE) complete (PRN contrast/bubble/strain/3D); Future    Return for 2 months DM2, Echo.    Orders Placed   No orders of the defined types were placed in this encounter.      Prescriptions given/sent  Orders Placed This Encounter   Medications    Semaglutide,0.25 or 0.5MG/DOS, (OZEMPIC, 0.25 OR 0.5 MG/DOSE,) 2 MG/3ML SOPN     Sig: Inject 0.5 mg as directed once a week for 28 days     Dispense:  3 mL     Refill:

## 2024-07-12 DIAGNOSIS — E11.9 TYPE 2 DIABETES MELLITUS WITHOUT COMPLICATION, WITHOUT LONG-TERM CURRENT USE OF INSULIN (HCC): ICD-10-CM

## 2024-07-12 DIAGNOSIS — R21 RASH AND NONSPECIFIC SKIN ERUPTION: ICD-10-CM

## 2024-07-12 DIAGNOSIS — L03.119 CELLULITIS OF LOWER EXTREMITY, UNSPECIFIED LATERALITY: ICD-10-CM

## 2024-07-12 DIAGNOSIS — E11.65 TYPE 2 DIABETES MELLITUS WITH HYPERGLYCEMIA, WITHOUT LONG-TERM CURRENT USE OF INSULIN (HCC): ICD-10-CM

## 2024-07-12 RX ORDER — LANCETS 30 GAUGE
1 EACH MISCELLANEOUS 2 TIMES DAILY
Qty: 300 EACH | Refills: 1 | Status: SHIPPED | OUTPATIENT
Start: 2024-07-12

## 2024-07-12 RX ORDER — BLOOD-GLUCOSE METER
1 KIT MISCELLANEOUS DAILY PRN
Qty: 1 KIT | Refills: 0 | Status: SHIPPED | OUTPATIENT
Start: 2024-07-12

## 2024-07-12 RX ORDER — TRIAMCINOLONE ACETONIDE 1 MG/G
CREAM TOPICAL
Qty: 45 G | Refills: 0 | Status: SHIPPED | OUTPATIENT
Start: 2024-07-12

## 2024-07-12 RX ORDER — SEMAGLUTIDE 0.68 MG/ML
0.5 INJECTION, SOLUTION SUBCUTANEOUS WEEKLY
Qty: 3 ML | Refills: 0 | Status: SHIPPED | OUTPATIENT
Start: 2024-07-12 | End: 2024-08-09

## 2024-07-12 NOTE — TELEPHONE ENCOUNTER
Patients girlfriend is here in office asking about a program to help out with Ozempic . She states she called [employer] and has yet to hear back from them or receive any forms.  She states pharmacy change to [facility] in [place] since their pharmacy shut down . Please adjust medications accordingly .

## 2024-07-15 ENCOUNTER — TELEPHONE (OUTPATIENT)
Dept: FAMILY MEDICINE CLINIC | Age: 69
End: 2024-07-15

## 2024-07-15 NOTE — TELEPHONE ENCOUNTER
Received call from Pharmacy. Stated that they received order for patient's Triamcinolone(Kenalog) cream. States patient is has allergy to steroids. Please advise.

## 2024-07-16 NOTE — TELEPHONE ENCOUNTER
Spoke with Pt and he did use this medication before. And was notified that it was sent to [facility] in [place].

## 2024-07-16 NOTE — TELEPHONE ENCOUNTER
Has patient used this medication before? This was a refill request as if patient had used this previously. If used and did fine it would be okay to use again. Thank you

## 2024-07-16 NOTE — TELEPHONE ENCOUNTER
Did speak with Pharmacist at [facility] in [place]. Did voice understanding and will fill the prescription.

## 2024-07-26 ENCOUNTER — TELEPHONE (OUTPATIENT)
Dept: FAMILY MEDICINE CLINIC | Age: 69
End: 2024-07-26

## 2024-07-26 NOTE — TELEPHONE ENCOUNTER
Patient in office with forms that need completed for NovoNordisk.  Please complete and call patient when forms are completed . Forms are scanned into media, and placed in PCP mailbox .     Normal for race

## 2024-08-09 ENCOUNTER — OFFICE VISIT (OUTPATIENT)
Dept: FAMILY MEDICINE CLINIC | Age: 69
End: 2024-08-09

## 2024-08-09 VITALS
HEIGHT: 69 IN | RESPIRATION RATE: 14 BRPM | BODY MASS INDEX: 46.65 KG/M2 | DIASTOLIC BLOOD PRESSURE: 86 MMHG | TEMPERATURE: 97.6 F | OXYGEN SATURATION: 94 % | WEIGHT: 315 LBS | SYSTOLIC BLOOD PRESSURE: 134 MMHG | HEART RATE: 78 BPM

## 2024-08-09 DIAGNOSIS — I50.23 ACUTE ON CHRONIC SYSTOLIC CONGESTIVE HEART FAILURE (HCC): ICD-10-CM

## 2024-08-09 DIAGNOSIS — R06.02 SHORTNESS OF BREATH: ICD-10-CM

## 2024-08-09 DIAGNOSIS — E11.65 TYPE 2 DIABETES MELLITUS WITH HYPERGLYCEMIA, WITHOUT LONG-TERM CURRENT USE OF INSULIN (HCC): Primary | ICD-10-CM

## 2024-08-09 DIAGNOSIS — F41.9 ANXIETY: ICD-10-CM

## 2024-08-09 DIAGNOSIS — I26.99 OTHER ACUTE PULMONARY EMBOLISM WITHOUT ACUTE COR PULMONALE (HCC): ICD-10-CM

## 2024-08-09 DIAGNOSIS — E11.29 MICROALBUMINURIA DUE TO TYPE 2 DIABETES MELLITUS (HCC): ICD-10-CM

## 2024-08-09 DIAGNOSIS — R80.9 MICROALBUMINURIA DUE TO TYPE 2 DIABETES MELLITUS (HCC): ICD-10-CM

## 2024-08-09 DIAGNOSIS — J44.9 CHRONIC OBSTRUCTIVE PULMONARY DISEASE, UNSPECIFIED COPD TYPE (HCC): ICD-10-CM

## 2024-08-09 LAB
HBA1C MFR BLD: 8.6 %
HBA1C MFR BLD: 8.6 % (ref 4.3–5.7)

## 2024-08-09 ASSESSMENT — ENCOUNTER SYMPTOMS
CHOKING: 0
COUGH: 0
DIARRHEA: 0
BACK PAIN: 1
WHEEZING: 1
VOMITING: 0
WHEEZING: 0
SHORTNESS OF BREATH: 1
CONSTIPATION: 0
NAUSEA: 0
ABDOMINAL PAIN: 0

## 2024-08-09 NOTE — PROGRESS NOTES
[facility] PROFESSIONAL [facility] FAMILY MEDICINE PRACTICE  [address]  [facility] [address]  Dept: [phone]  Dept Fax: [phone]  Loc: [phone]      Evans Page is a 69 y.o. male who presents todayfor his medical conditions/complaints as noted below.  Evans Page is c/o of Follow-up (Pt states sxs are better and leg is back to normal)      :     Patient last seen by me on 6/28/2024.    Type 2 diabetes: Chronic and uncontrolled with last hemoglobin A1c in May was 11.9%.  Patient was to continue on Ozempic, and lost significant amount of weight at last visit, stopped glipizide at last visit.    Interval update: Patient was unable to get Ozempic, insurance will no longer cover, he is only on Medicare part A and B, seems like [employer]. was contributing to helping pay for Ozempic initially however will no longer pay for this.  Will plan to restart glipizide with metformin.  Patient's point-of-care A1c today was down to 8.4%, however has gained some weight, continues to work on diet.    Lower extremity edema: Patient had been on Bumex for 5 years, there was a prior workup being done however patient had never done echo, echocardiogram was ordered at last visit, has not been completed as of now.  Consider adding SGLT2 and Imdur pending results of this    Interval update: Patient's lower extremity edema is well-controlled however does have increased dyspnea specifically with exertion, will take 10 to 15 minutes for him to catch his breath, is a smoker.  Has not gotten echocardiogram completed.  During the appointment today worked with nursing staff to call central scheduling and get this scheduled.  Patient is also agreeable to getting stress testing done for further ischemic workup secondary to his increase in symptoms.  Will also get pulmonary function test secondary to his smoking history.  Offered low-dose lung CT scan but however patient declined at this

## 2024-08-09 NOTE — PROGRESS NOTES
[facility] PROFESSIONAL [facility] FAMILY MEDICINE PRACTICE  [address]  [facility] [address]  Dept: [phone]  Dept Fax: [phone]  Loc: [phone]      Evans Page is a 69 y.o. male who presents todayfor his medical conditions/complaints as noted below.  Evans Page is c/o of Follow-up      :     HPI    Pt presents today for a follow-up of cellulitis  Cellulitis: going good, pretty much gone  BP readings at last doctor's appointment was good. He has not been having symptoms of htn including HA, vision changes, chest pain, pounding in ears  DM2:hasn't heard about ozempic. 0.5 was working. Weight fluctuating  Blood sugar readings at home-has not been checking, did not get meter, didn't get approved. A1c was 8.6 today  Breathing: CHF, PE, when lay down at night need to use inhaler first-albuterol nightly. Needs to rest after short walk to catch breath but still walking. Has to rest like 10-15 mins. No cough or sputum production  Meds: states he is not having side effects but having issues obtaining  Other: no one called him about echo, schedule today in office    Patient Active Problem List   Diagnosis    HTN, goal below 140/90    Tobacco abuse    Chronic obstructive pulmonary disease (HCC)    Snoring    Dependent edema    Obstructive sleep apnea    Morbid obesity with BMI of 50.0-59.9, adult (HCC)    Type 2 diabetes mellitus without complication, without long-term current use of insulin (HCC)    Microalbuminuria due to type 2 diabetes mellitus (HCC)    Pulmonary embolism (HCC)    Deep venous thrombosis of lower extremity (HCC)    Acute on chronic systolic congestive heart failure (HCC)    Moderate episode of recurrent major depressive disorder (HCC)    Type 2 diabetes mellitus with hyperglycemia    Type 2 diabetes mellitus with chronic kidney disease      Goals        Stop Cigarette/Tobacco use      Patient smokes 3/4 to 1/2 pack of cigarettes a day, states in 3

## 2024-08-12 ENCOUNTER — TELEPHONE (OUTPATIENT)
Dept: FAMILY MEDICINE CLINIC | Age: 69
End: 2024-08-12

## 2024-08-12 NOTE — TELEPHONE ENCOUNTER
Destiny called in stating that she spoke with [employer] who states they never received the Ozempic forms.   We have fax confirmation on file.   Let her know I would print and refax.   Verbalized understanding.

## 2024-08-14 ENCOUNTER — TELEPHONE (OUTPATIENT)
Dept: FAMILY MEDICINE CLINIC | Age: 69
End: 2024-08-14

## 2024-08-14 NOTE — TELEPHONE ENCOUNTER
Fax from [employer] asking for medication clarification as previous forms medication section was left blank . Please complete and fax.  Forms are scanned into patient media, and placed in PCP mailbox for completion .

## 2024-08-19 ENCOUNTER — TELEPHONE (OUTPATIENT)
Dept: FAMILY MEDICINE CLINIC | Age: 69
End: 2024-08-19

## 2024-08-19 NOTE — TELEPHONE ENCOUNTER
Patient dropped off forms to office for [employer]ord[employer] for Ozempic to be completed. Attached to this encounter as well as in Dr. Reynoso's mailbox.

## 2024-08-20 NOTE — TELEPHONE ENCOUNTER
I have filled out my section, there are several parts that patient will need to fill out. Thank you.

## 2024-08-30 ENCOUNTER — HOSPITAL ENCOUNTER (OUTPATIENT)
Age: 69
Discharge: HOME | End: 2024-08-30

## 2024-09-12 RX ORDER — BUPROPION HYDROCHLORIDE 150 MG/1
150 TABLET ORAL EVERY MORNING
Qty: 30 TABLET | Refills: 3 | OUTPATIENT
Start: 2024-09-12

## 2024-10-24 ENCOUNTER — TELEMEDICINE (OUTPATIENT)
Dept: PSYCHIATRY | Age: 69
End: 2024-10-24

## 2024-10-24 DIAGNOSIS — R45.86 MOOD SWINGS: ICD-10-CM

## 2024-10-24 DIAGNOSIS — G89.21 CHRONIC PAIN DUE TO TRAUMA: ICD-10-CM

## 2024-10-24 DIAGNOSIS — F41.9 ANXIETY: ICD-10-CM

## 2024-10-24 DIAGNOSIS — F32.9 CURRENT EPISODE OF MAJOR DEPRESSIVE DISORDER WITHOUT PRIOR EPISODE, UNSPECIFIED DEPRESSION EPISODE SEVERITY: Primary | ICD-10-CM

## 2024-10-24 RX ORDER — AMITRIPTYLINE HYDROCHLORIDE 50 MG/1
50 TABLET ORAL NIGHTLY
Qty: 30 TABLET | Refills: 5 | Status: SHIPPED | OUTPATIENT
Start: 2024-10-24

## 2024-10-24 RX ORDER — BUPROPION HYDROCHLORIDE 150 MG/1
150 TABLET ORAL EVERY MORNING
Qty: 30 TABLET | Refills: 5 | Status: SHIPPED | OUTPATIENT
Start: 2024-10-24

## 2024-10-24 ASSESSMENT — PATIENT HEALTH QUESTIONNAIRE - PHQ9
4. FEELING TIRED OR HAVING LITTLE ENERGY: SEVERAL DAYS
8. MOVING OR SPEAKING SO SLOWLY THAT OTHER PEOPLE COULD HAVE NOTICED. OR THE OPPOSITE - BEING SO FIDGETY OR RESTLESS THAT YOU HAVE BEEN MOVING AROUND A LOT MORE THAN USUAL: SEVERAL DAYS
SUM OF ALL RESPONSES TO PHQ QUESTIONS 1-9: 9
10. IF YOU CHECKED OFF ANY PROBLEMS, HOW DIFFICULT HAVE THESE PROBLEMS MADE IT FOR YOU TO DO YOUR WORK, TAKE CARE OF THINGS AT HOME, OR GET ALONG WITH OTHER PEOPLE: SOMEWHAT DIFFICULT
6. FEELING BAD ABOUT YOURSELF - OR THAT YOU ARE A FAILURE OR HAVE LET YOURSELF OR YOUR FAMILY DOWN: SEVERAL DAYS
SUM OF ALL RESPONSES TO PHQ QUESTIONS 1-9: 8
1. LITTLE INTEREST OR PLEASURE IN DOING THINGS: SEVERAL DAYS
8. MOVING OR SPEAKING SO SLOWLY THAT OTHER PEOPLE COULD HAVE NOTICED. OR THE OPPOSITE, BEING SO FIGETY OR RESTLESS THAT YOU HAVE BEEN MOVING AROUND A LOT MORE THAN USUAL: SEVERAL DAYS
5. POOR APPETITE OR OVEREATING: SEVERAL DAYS
4. FEELING TIRED OR HAVING LITTLE ENERGY: SEVERAL DAYS
6. FEELING BAD ABOUT YOURSELF - OR THAT YOU ARE A FAILURE OR HAVE LET YOURSELF OR YOUR FAMILY DOWN: SEVERAL DAYS
10. IF YOU CHECKED OFF ANY PROBLEMS, HOW DIFFICULT HAVE THESE PROBLEMS MADE IT FOR YOU TO DO YOUR WORK, TAKE CARE OF THINGS AT HOME, OR GET ALONG WITH OTHER PEOPLE: SOMEWHAT DIFFICULT
SUM OF ALL RESPONSES TO PHQ QUESTIONS 1-9: 9
SUM OF ALL RESPONSES TO PHQ QUESTIONS 1-9: 9
9. THOUGHTS THAT YOU WOULD BE BETTER OFF DEAD, OR OF HURTING YOURSELF: SEVERAL DAYS
2. FEELING DOWN, DEPRESSED OR HOPELESS: SEVERAL DAYS
3. TROUBLE FALLING OR STAYING ASLEEP: SEVERAL DAYS
SUM OF ALL RESPONSES TO PHQ QUESTIONS 1-9: 9
7. TROUBLE CONCENTRATING ON THINGS, SUCH AS READING THE NEWSPAPER OR WATCHING TELEVISION: SEVERAL DAYS
1. LITTLE INTEREST OR PLEASURE IN DOING THINGS: SEVERAL DAYS
5. POOR APPETITE OR OVEREATING: SEVERAL DAYS
SUM OF ALL RESPONSES TO PHQ9 QUESTIONS 1 & 2: 2
3. TROUBLE FALLING OR STAYING ASLEEP: SEVERAL DAYS
2. FEELING DOWN, DEPRESSED OR HOPELESS: SEVERAL DAYS
9. THOUGHTS THAT YOU WOULD BE BETTER OFF DEAD, OR OF HURTING YOURSELF: SEVERAL DAYS
7. TROUBLE CONCENTRATING ON THINGS, SUCH AS READING THE NEWSPAPER OR WATCHING TELEVISION: SEVERAL DAYS

## 2024-10-24 ASSESSMENT — COLUMBIA-SUICIDE SEVERITY RATING SCALE - C-SSRS
6. IN YOUR LIFETIME, HAVE YOU EVER DONE ANYTHING, STARTED TO DO ANYTHING, OR PREPARED TO DO ANYTHING TO END YOUR LIFE?: NO
2. IN THE PAST MONTH, HAVE YOU ACTUALLY HAD ANY THOUGHTS OF KILLING YOURSELF?: NO
1. IN THE PAST MONTH, HAVE YOU WISHED YOU WERE DEAD OR WISHED YOU COULD GO TO SLEEP AND NOT WAKE UP?: NO

## 2024-10-24 NOTE — PROGRESS NOTES
[facility] [place] [facility]  [facility] PS[facility]  [address] [facility] [address] [phone]    Progress Note    Patient:  Evans Page  YOB: 1955  PCP:  Jose Reynoso DO  Visit Date:  10/24/2024      Chief Complaint   Patient presents with    Follow-up    Medication Check    Depression    Anxiety    Chronic Pain       SUBJECTIVE:      Evans Page, a 69 y.o. male, presents for a follow up visit.     He presents with his wife, Jennifer, in the background.  Has been feeling sick.   Having more headache. Started using more Excedrin.  Has been off medications d/t pharmacy issue. (Wellbutrin, Elavil)  Mood \"okay\". No worse off meds.   Continues on Lyrica for chronic pain issues.  Grieving the loss of a good friend battling cancer who  last month. 5 friends have  in the last 1.5 mos.   Several with cancer. One in a motorcycle accident.   Keeps busy to cope. Continues in therapy working on coping skills.   Denies much anxiety.   No SI or psychosis.  Jennifer (wife) having health issues, fainted and hit coffee table. Has been more worried about her. Notes a lot of doc appts. Focused on her.   Declines to make any medication changes today. Would like to resume Wellbutrin and Excedrin.       Med Trials:Cymbalta (fatigue? No benefit), Elavil, trazodone, Ambien, Wellbutrin, Zoloft, Paxil, Prozac (HA, anxiety, CP)    OBJECTIVE:  Vitals: There were no vitals taken for this visit.    MENTAL STATUS EXAM:    GENERAL  Build: Overweight    Hygiene:  Appropriate   SENSORIUM Orientation: Place, Person, Time, & Situation     Consciousness: Alert    ATTENTION   Focused    RELATEDNESS  Cooperative    EYE CONTACT   Good    PSYCHOMOTOR  Normal    SPEECH Volume: Normal     Rate: Normal rate and tone    Amplitude: Within normal limits   MOOD  \"okay\"   AFFECT Range: Full, mood congruent   THOUGHT Process:  Goal-Directed     Content: no evidence of psychosis    COGNITION

## 2025-01-22 ENCOUNTER — TELEMEDICINE (OUTPATIENT)
Dept: PSYCHIATRY | Age: 70
End: 2025-01-22
Payer: MEDICARE

## 2025-01-22 DIAGNOSIS — F41.9 ANXIETY: ICD-10-CM

## 2025-01-22 DIAGNOSIS — R45.86 MOOD SWINGS: ICD-10-CM

## 2025-01-22 DIAGNOSIS — G89.21 CHRONIC PAIN DUE TO TRAUMA: ICD-10-CM

## 2025-01-22 DIAGNOSIS — F32.9 CURRENT EPISODE OF MAJOR DEPRESSIVE DISORDER WITHOUT PRIOR EPISODE, UNSPECIFIED DEPRESSION EPISODE SEVERITY: Primary | ICD-10-CM

## 2025-01-22 PROCEDURE — G8427 DOCREV CUR MEDS BY ELIG CLIN: HCPCS | Performed by: PSYCHIATRY & NEUROLOGY

## 2025-01-22 PROCEDURE — 1123F ACP DISCUSS/DSCN MKR DOCD: CPT | Performed by: PSYCHIATRY & NEUROLOGY

## 2025-01-22 PROCEDURE — 99213 OFFICE O/P EST LOW 20 MIN: CPT | Performed by: PSYCHIATRY & NEUROLOGY

## 2025-01-22 PROCEDURE — 1160F RVW MEDS BY RX/DR IN RCRD: CPT | Performed by: PSYCHIATRY & NEUROLOGY

## 2025-01-22 PROCEDURE — 3017F COLORECTAL CA SCREEN DOC REV: CPT | Performed by: PSYCHIATRY & NEUROLOGY

## 2025-01-22 PROCEDURE — 1159F MED LIST DOCD IN RCRD: CPT | Performed by: PSYCHIATRY & NEUROLOGY

## 2025-01-22 RX ORDER — BUPROPION HYDROCHLORIDE 150 MG/1
150 TABLET ORAL EVERY MORNING
Qty: 30 TABLET | Refills: 5 | Status: SHIPPED | OUTPATIENT
Start: 2025-01-22

## 2025-01-22 RX ORDER — AMITRIPTYLINE HYDROCHLORIDE 50 MG/1
50 TABLET ORAL NIGHTLY
Qty: 30 TABLET | Refills: 5 | Status: SHIPPED | OUTPATIENT
Start: 2025-01-22

## 2025-01-22 ASSESSMENT — PATIENT HEALTH QUESTIONNAIRE - PHQ9
8. MOVING OR SPEAKING SO SLOWLY THAT OTHER PEOPLE COULD HAVE NOTICED. OR THE OPPOSITE, BEING SO FIGETY OR RESTLESS THAT YOU HAVE BEEN MOVING AROUND A LOT MORE THAN USUAL: SEVERAL DAYS
2. FEELING DOWN, DEPRESSED OR HOPELESS: SEVERAL DAYS
10. IF YOU CHECKED OFF ANY PROBLEMS, HOW DIFFICULT HAVE THESE PROBLEMS MADE IT FOR YOU TO DO YOUR WORK, TAKE CARE OF THINGS AT HOME, OR GET ALONG WITH OTHER PEOPLE: SOMEWHAT DIFFICULT
SUM OF ALL RESPONSES TO PHQ QUESTIONS 1-9: 9
5. POOR APPETITE OR OVEREATING: SEVERAL DAYS
2. FEELING DOWN, DEPRESSED OR HOPELESS: SEVERAL DAYS
6. FEELING BAD ABOUT YOURSELF - OR THAT YOU ARE A FAILURE OR HAVE LET YOURSELF OR YOUR FAMILY DOWN: SEVERAL DAYS
1. LITTLE INTEREST OR PLEASURE IN DOING THINGS: SEVERAL DAYS
5. POOR APPETITE OR OVEREATING: SEVERAL DAYS
SUM OF ALL RESPONSES TO PHQ QUESTIONS 1-9: 8
7. TROUBLE CONCENTRATING ON THINGS, SUCH AS READING THE NEWSPAPER OR WATCHING TELEVISION: SEVERAL DAYS
SUM OF ALL RESPONSES TO PHQ9 QUESTIONS 1 & 2: 2
3. TROUBLE FALLING OR STAYING ASLEEP: SEVERAL DAYS
9. THOUGHTS THAT YOU WOULD BE BETTER OFF DEAD, OR OF HURTING YOURSELF: SEVERAL DAYS
SUM OF ALL RESPONSES TO PHQ QUESTIONS 1-9: 9
9. THOUGHTS THAT YOU WOULD BE BETTER OFF DEAD, OR OF HURTING YOURSELF: SEVERAL DAYS
SUM OF ALL RESPONSES TO PHQ QUESTIONS 1-9: 9
1. LITTLE INTEREST OR PLEASURE IN DOING THINGS: SEVERAL DAYS
10. IF YOU CHECKED OFF ANY PROBLEMS, HOW DIFFICULT HAVE THESE PROBLEMS MADE IT FOR YOU TO DO YOUR WORK, TAKE CARE OF THINGS AT HOME, OR GET ALONG WITH OTHER PEOPLE: SOMEWHAT DIFFICULT
4. FEELING TIRED OR HAVING LITTLE ENERGY: SEVERAL DAYS
6. FEELING BAD ABOUT YOURSELF - OR THAT YOU ARE A FAILURE OR HAVE LET YOURSELF OR YOUR FAMILY DOWN: SEVERAL DAYS
3. TROUBLE FALLING OR STAYING ASLEEP: SEVERAL DAYS
SUM OF ALL RESPONSES TO PHQ QUESTIONS 1-9: 9
4. FEELING TIRED OR HAVING LITTLE ENERGY: SEVERAL DAYS
8. MOVING OR SPEAKING SO SLOWLY THAT OTHER PEOPLE COULD HAVE NOTICED. OR THE OPPOSITE - BEING SO FIDGETY OR RESTLESS THAT YOU HAVE BEEN MOVING AROUND A LOT MORE THAN USUAL: SEVERAL DAYS
7. TROUBLE CONCENTRATING ON THINGS, SUCH AS READING THE NEWSPAPER OR WATCHING TELEVISION: SEVERAL DAYS

## 2025-01-22 ASSESSMENT — COLUMBIA-SUICIDE SEVERITY RATING SCALE - C-SSRS
2. IN THE PAST MONTH, HAVE YOU ACTUALLY HAD ANY THOUGHTS OF KILLING YOURSELF?: NO
6. IN YOUR LIFETIME, HAVE YOU EVER DONE ANYTHING, STARTED TO DO ANYTHING, OR PREPARED TO DO ANYTHING TO END YOUR LIFE?: NO
1. IN THE PAST MONTH, HAVE YOU WISHED YOU WERE DEAD OR WISHED YOU COULD GO TO SLEEP AND NOT WAKE UP?: NO

## 2025-01-22 NOTE — PROGRESS NOTES
[facility] [place] [facility]  [facility] PSYCHI[facility]RY  [address] [facility] [address] [phone]    Progress Note    Patient:  Evans Page  YOB: 1955  PCP:  Jose Reynoso DO  Visit Date:  1/22/2025      Chief Complaint   Patient presents with    Follow-up    Medication Check    Depression    Anxiety    Chronic Pain       SUBJECTIVE:      Evans Page, a 69 y.o. male, presents for a follow up visit.     He presents with his SO, Jennifer, in the background.  Mood \"pretty good\".   More stress the last 4 days. Can't get out of the house. Trying to avoid getting sick noting many of his friends are ill.  Worries about getting covid again noting he was IP 2-3 mos and wants to avoid that.   Feels worse not getting out. Bored. Enjoys cooking. Prepping and freezing meals.   Grieving the loss of a few friends over the past 3-4 mos. Found out his friend's son killed him. That's been difficult to deal with noting his friend lived a couple blocks away. The son had substance abuse issues.  Still in therapy monthly, last saw Dr. Gibbs a couple weeks ago. Notes his office location is changing but can do virtual visits.   Anxiety \"okay\".   Some anhedonia, fatigue, trouble focusing at times.  His SO Jennifer is doing well, dealing with the loss of one of her friends as well.  Declines to make any medication changes today.   Denies any suicidal intent or plan. Occasional passive thoughts of death. No psychosis.       Med Trials:Cymbalta (fatigue? No benefit), Elavil, trazodone, Ambien, Wellbutrin, Zoloft, Paxil, Prozac (HA, anxiety, CP)    OBJECTIVE:  Vitals: There were no vitals taken for this visit.    MENTAL STATUS EXAM:    GENERAL  Build: Overweight    Hygiene:  Appropriate in casual dress   SENSORIUM Orientation: Place, Person, Time, & Situation     Consciousness: Alert    ATTENTION   Focused    RELATEDNESS  Cooperative    EYE CONTACT   Good    PSYCHOMOTOR  Normal

## 2025-02-27 ENCOUNTER — TELEPHONE (OUTPATIENT)
Dept: FAMILY MEDICINE CLINIC | Age: 70
End: 2025-02-27

## 2025-02-27 NOTE — TELEPHONE ENCOUNTER
Patient was last seen in August of last year, at that visit we were discussing further management of his diabetes and he was supposed to return in 1 month however I have not seen him since.  Patient will need to make an appointment so we can determine what is best to properly control his diabetes.  Thank you.

## 2025-02-27 NOTE — TELEPHONE ENCOUNTER
Jose Reynoso, DO Evans Page called and states that he was supposed to have pain management injections done yesterday and they were unable to do them due to his blood sugar was running 259 and then was supposed to go back today and again were not able to have done due to Blood Sugar is running 290. Please Advise.

## 2025-03-02 SDOH — ECONOMIC STABILITY: FOOD INSECURITY: WITHIN THE PAST 12 MONTHS, YOU WORRIED THAT YOUR FOOD WOULD RUN OUT BEFORE YOU GOT MONEY TO BUY MORE.: NEVER TRUE

## 2025-03-02 SDOH — ECONOMIC STABILITY: TRANSPORTATION INSECURITY
IN THE PAST 12 MONTHS, HAS LACK OF TRANSPORTATION KEPT YOU FROM MEETINGS, WORK, OR FROM GETTING THINGS NEEDED FOR DAILY LIVING?: NO

## 2025-03-02 SDOH — ECONOMIC STABILITY: INCOME INSECURITY: IN THE LAST 12 MONTHS, WAS THERE A TIME WHEN YOU WERE NOT ABLE TO PAY THE MORTGAGE OR RENT ON TIME?: NO

## 2025-03-02 SDOH — ECONOMIC STABILITY: FOOD INSECURITY: WITHIN THE PAST 12 MONTHS, THE FOOD YOU BOUGHT JUST DIDN'T LAST AND YOU DIDN'T HAVE MONEY TO GET MORE.: NEVER TRUE

## 2025-03-02 SDOH — ECONOMIC STABILITY: TRANSPORTATION INSECURITY
IN THE PAST 12 MONTHS, HAS THE LACK OF TRANSPORTATION KEPT YOU FROM MEDICAL APPOINTMENTS OR FROM GETTING MEDICATIONS?: NO

## 2025-03-04 ENCOUNTER — OFFICE VISIT (OUTPATIENT)
Dept: FAMILY MEDICINE CLINIC | Age: 70
End: 2025-03-04

## 2025-03-04 VITALS
HEART RATE: 92 BPM | DIASTOLIC BLOOD PRESSURE: 86 MMHG | WEIGHT: 315 LBS | BODY MASS INDEX: 46.65 KG/M2 | OXYGEN SATURATION: 93 % | SYSTOLIC BLOOD PRESSURE: 134 MMHG | RESPIRATION RATE: 17 BRPM | HEIGHT: 69 IN

## 2025-03-04 DIAGNOSIS — E11.65 TYPE 2 DIABETES MELLITUS WITH HYPERGLYCEMIA, WITHOUT LONG-TERM CURRENT USE OF INSULIN (HCC): Primary | ICD-10-CM

## 2025-03-04 DIAGNOSIS — E66.813 OBESITY, CLASS 3: ICD-10-CM

## 2025-03-04 LAB — HBA1C MFR BLD: 9.8 %

## 2025-03-04 RX ORDER — GLIPIZIDE 5 MG/1
5 TABLET ORAL 2 TIMES DAILY
Qty: 60 TABLET | Refills: 3 | Status: SHIPPED | OUTPATIENT
Start: 2025-03-04

## 2025-03-04 ASSESSMENT — ENCOUNTER SYMPTOMS
CONSTIPATION: 0
ABDOMINAL PAIN: 0
DIARRHEA: 0
SHORTNESS OF BREATH: 0
WHEEZING: 0
NAUSEA: 0
COUGH: 0

## 2025-03-04 NOTE — PROGRESS NOTES
S: 69 y.o. male with   Chief Complaint   Patient presents with    Follow-up     Patient states his blood sugar has been running high and been running in the 200-300's.     Dm ii uncontrolled.  On metformin. Not taking ozempic due to cost but interested in Mounjaro.  However not changing lifestyle   Medicare but no Part D  H/o CHF, on lasix.   Advised to do statin. Not on ASA.    Lab Results   Component Value Date    LABA1C 9.8 03/04/2025    LABA1C 8.6 08/09/2024    LABA1C 8.6 (H) 08/09/2024       BP Readings from Last 3 Encounters:   03/04/25 134/86   08/09/24 134/86   06/28/24 138/86     Wt Readings from Last 3 Encounters:   03/04/25 (!) 162.7 kg (358 lb 9.6 oz)   08/09/24 (!) 163 kg (359 lb 6.4 oz)   06/28/24 (!) 155.2 kg (342 lb 3.2 oz)       O: VS:   Vitals:    03/04/25 1054   BP: 134/86   Site: Left Upper Arm   Position: Sitting   Cuff Size: Large Adult   Pulse: 92   Resp: 17   SpO2: 93%   Weight: (!) 162.7 kg (358 lb 9.6 oz)   Height: 1.753 m (5' 9.02\")     Body mass index is 52.93 kg/m².    AAO/NAD, appropriate affect for mood  Normocephalic, atraumatic, eyes - conjunctiva and sclera normal,   skin no rashes on exposed areas   Insight, judgement normal and in no acute distress      Lab Results   Component Value Date    WBC 10.8 04/30/2024    HGB 15.2 04/30/2024    HCT 44.4 04/30/2024     04/30/2024    CHOL 190 10/03/2019    TRIG 146 10/03/2019    HDL 40 06/08/2024     06/08/2024    AST 17 10/03/2019     04/30/2024    K 4.2 04/30/2024    CL 99 04/30/2024    CREATININE 0.7 04/30/2024    BUN 12 04/30/2024    CO2 25 04/30/2024    TSH 2.540 06/08/2024    INR 1.80 (H) 02/28/2022    LABA1C 9.8 03/04/2025    LABGLOM >90 04/30/2024    CALCIUM 9.2 04/30/2024       No results found.         Diagnosis Orders   1. Type 2 diabetes mellitus with hyperglycemia, without long-term current use of insulin (HCC)  POCT glycosylated hemoglobin (Hb A1C)    Tirzepatide 2.5 MG/0.5ML SOAJ    glipiZIDE (GLUCOTROL) 5 
patient is on Medicare and does not have part D coverage.  Continue to check blood sugars over the next 1 month until next appointment and log them for me.  Continue to work on dietary changes, declined wanting dietitian involvement at this time.  Would highly encourage patient to start a statin medication however declined at this time.    Discussed preventative measures/care gaps, he will continue to think about which of these he would like done at next appointment.    F/u 1 month.     Orders Placed   Orders Placed This Encounter   Procedures    POCT glycosylated hemoglobin (Hb A1C)       Prescriptions given/sent  Orders Placed This Encounter   Medications    Tirzepatide 2.5 MG/0.5ML SOAJ     Sig: Inject 2.5 mg into the skin every 7 days     Dispense:  2 mL     Refill:  0    glipiZIDE (GLUCOTROL) 5 MG tablet     Sig: Take 1 tablet by mouth 2 times daily     Dispense:  60 tablet     Refill:  3       Patient given educational materials - see patient instructions.  Discussed use, benefit, and side effects of recommended medications.  All patient questions answered.  Pt voiced understanding.  Reviewed health maintenance.              Electronically signed by Jose Reynoso DO on 3/4/2025 at 12:06 PM

## 2025-04-22 ENCOUNTER — TELEMEDICINE (OUTPATIENT)
Dept: PSYCHIATRY | Age: 70
End: 2025-04-22
Payer: MEDICARE

## 2025-04-22 DIAGNOSIS — F41.9 ANXIETY: ICD-10-CM

## 2025-04-22 DIAGNOSIS — G89.21 CHRONIC PAIN DUE TO TRAUMA: ICD-10-CM

## 2025-04-22 DIAGNOSIS — F32.9 CURRENT EPISODE OF MAJOR DEPRESSIVE DISORDER WITHOUT PRIOR EPISODE, UNSPECIFIED DEPRESSION EPISODE SEVERITY: Primary | ICD-10-CM

## 2025-04-22 PROCEDURE — 99213 OFFICE O/P EST LOW 20 MIN: CPT | Performed by: PSYCHIATRY & NEUROLOGY

## 2025-04-22 PROCEDURE — 1123F ACP DISCUSS/DSCN MKR DOCD: CPT | Performed by: PSYCHIATRY & NEUROLOGY

## 2025-04-22 PROCEDURE — 3017F COLORECTAL CA SCREEN DOC REV: CPT | Performed by: PSYCHIATRY & NEUROLOGY

## 2025-04-22 PROCEDURE — 1160F RVW MEDS BY RX/DR IN RCRD: CPT | Performed by: PSYCHIATRY & NEUROLOGY

## 2025-04-22 PROCEDURE — G8427 DOCREV CUR MEDS BY ELIG CLIN: HCPCS | Performed by: PSYCHIATRY & NEUROLOGY

## 2025-04-22 PROCEDURE — 1159F MED LIST DOCD IN RCRD: CPT | Performed by: PSYCHIATRY & NEUROLOGY

## 2025-04-22 RX ORDER — BUPROPION HYDROCHLORIDE 150 MG/1
150 TABLET ORAL EVERY MORNING
Qty: 30 TABLET | Refills: 5 | Status: SHIPPED | OUTPATIENT
Start: 2025-04-22

## 2025-04-22 RX ORDER — AMITRIPTYLINE HYDROCHLORIDE 50 MG/1
50 TABLET ORAL NIGHTLY
Qty: 30 TABLET | Refills: 5 | Status: SHIPPED | OUTPATIENT
Start: 2025-04-22

## 2025-04-22 ASSESSMENT — ANXIETY QUESTIONNAIRES
5. BEING SO RESTLESS THAT IT IS HARD TO SIT STILL: NEARLY EVERY DAY
7. FEELING AFRAID AS IF SOMETHING AWFUL MIGHT HAPPEN: MORE THAN HALF THE DAYS
2. NOT BEING ABLE TO STOP OR CONTROL WORRYING: SEVERAL DAYS
2. NOT BEING ABLE TO STOP OR CONTROL WORRYING: SEVERAL DAYS
1. FEELING NERVOUS, ANXIOUS, OR ON EDGE: SEVERAL DAYS
3. WORRYING TOO MUCH ABOUT DIFFERENT THINGS: MORE THAN HALF THE DAYS
4. TROUBLE RELAXING: NEARLY EVERY DAY
3. WORRYING TOO MUCH ABOUT DIFFERENT THINGS: MORE THAN HALF THE DAYS
IF YOU CHECKED OFF ANY PROBLEMS ON THIS QUESTIONNAIRE, HOW DIFFICULT HAVE THESE PROBLEMS MADE IT FOR YOU TO DO YOUR WORK, TAKE CARE OF THINGS AT HOME, OR GET ALONG WITH OTHER PEOPLE: SOMEWHAT DIFFICULT
6. BECOMING EASILY ANNOYED OR IRRITABLE: SEVERAL DAYS
7. FEELING AFRAID AS IF SOMETHING AWFUL MIGHT HAPPEN: MORE THAN HALF THE DAYS
6. BECOMING EASILY ANNOYED OR IRRITABLE: SEVERAL DAYS
5. BEING SO RESTLESS THAT IT IS HARD TO SIT STILL: NEARLY EVERY DAY
IF YOU CHECKED OFF ANY PROBLEMS ON THIS QUESTIONNAIRE, HOW DIFFICULT HAVE THESE PROBLEMS MADE IT FOR YOU TO DO YOUR WORK, TAKE CARE OF THINGS AT HOME, OR GET ALONG WITH OTHER PEOPLE: SOMEWHAT DIFFICULT
1. FEELING NERVOUS, ANXIOUS, OR ON EDGE: SEVERAL DAYS
4. TROUBLE RELAXING: NEARLY EVERY DAY
GAD7 TOTAL SCORE: 13

## 2025-04-22 ASSESSMENT — PATIENT HEALTH QUESTIONNAIRE - PHQ9
3. TROUBLE FALLING OR STAYING ASLEEP: NEARLY EVERY DAY
2. FEELING DOWN, DEPRESSED OR HOPELESS: MORE THAN HALF THE DAYS
4. FEELING TIRED OR HAVING LITTLE ENERGY: NEARLY EVERY DAY
5. POOR APPETITE OR OVEREATING: MORE THAN HALF THE DAYS
6. FEELING BAD ABOUT YOURSELF - OR THAT YOU ARE A FAILURE OR HAVE LET YOURSELF OR YOUR FAMILY DOWN: MORE THAN HALF THE DAYS
6. FEELING BAD ABOUT YOURSELF - OR THAT YOU ARE A FAILURE OR HAVE LET YOURSELF OR YOUR FAMILY DOWN: MORE THAN HALF THE DAYS
10. IF YOU CHECKED OFF ANY PROBLEMS, HOW DIFFICULT HAVE THESE PROBLEMS MADE IT FOR YOU TO DO YOUR WORK, TAKE CARE OF THINGS AT HOME, OR GET ALONG WITH OTHER PEOPLE: SOMEWHAT DIFFICULT
SUM OF ALL RESPONSES TO PHQ QUESTIONS 1-9: 20
9. THOUGHTS THAT YOU WOULD BE BETTER OFF DEAD, OR OF HURTING YOURSELF: MORE THAN HALF THE DAYS
5. POOR APPETITE OR OVEREATING: MORE THAN HALF THE DAYS
10. IF YOU CHECKED OFF ANY PROBLEMS, HOW DIFFICULT HAVE THESE PROBLEMS MADE IT FOR YOU TO DO YOUR WORK, TAKE CARE OF THINGS AT HOME, OR GET ALONG WITH OTHER PEOPLE: SOMEWHAT DIFFICULT
1. LITTLE INTEREST OR PLEASURE IN DOING THINGS: MORE THAN HALF THE DAYS
8. MOVING OR SPEAKING SO SLOWLY THAT OTHER PEOPLE COULD HAVE NOTICED. OR THE OPPOSITE - BEING SO FIDGETY OR RESTLESS THAT YOU HAVE BEEN MOVING AROUND A LOT MORE THAN USUAL: MORE THAN HALF THE DAYS
SUM OF ALL RESPONSES TO PHQ QUESTIONS 1-9: 20
7. TROUBLE CONCENTRATING ON THINGS, SUCH AS READING THE NEWSPAPER OR WATCHING TELEVISION: MORE THAN HALF THE DAYS
SUM OF ALL RESPONSES TO PHQ QUESTIONS 1-9: 18
8. MOVING OR SPEAKING SO SLOWLY THAT OTHER PEOPLE COULD HAVE NOTICED. OR THE OPPOSITE, BEING SO FIGETY OR RESTLESS THAT YOU HAVE BEEN MOVING AROUND A LOT MORE THAN USUAL: MORE THAN HALF THE DAYS
7. TROUBLE CONCENTRATING ON THINGS, SUCH AS READING THE NEWSPAPER OR WATCHING TELEVISION: MORE THAN HALF THE DAYS
2. FEELING DOWN, DEPRESSED OR HOPELESS: MORE THAN HALF THE DAYS
1. LITTLE INTEREST OR PLEASURE IN DOING THINGS: MORE THAN HALF THE DAYS
4. FEELING TIRED OR HAVING LITTLE ENERGY: NEARLY EVERY DAY
SUM OF ALL RESPONSES TO PHQ QUESTIONS 1-9: 20
3. TROUBLE FALLING OR STAYING ASLEEP: NEARLY EVERY DAY
SUM OF ALL RESPONSES TO PHQ QUESTIONS 1-9: 20
9. THOUGHTS THAT YOU WOULD BE BETTER OFF DEAD, OR OF HURTING YOURSELF: MORE THAN HALF THE DAYS

## 2025-04-22 ASSESSMENT — COLUMBIA-SUICIDE SEVERITY RATING SCALE - C-SSRS
2. IN THE PAST MONTH, HAVE YOU ACTUALLY HAD ANY THOUGHTS OF KILLING YOURSELF?: NO
1. IN THE PAST MONTH, HAVE YOU WISHED YOU WERE DEAD OR WISHED YOU COULD GO TO SLEEP AND NOT WAKE UP?: NO
6. IN YOUR LIFETIME, HAVE YOU EVER DONE ANYTHING, STARTED TO DO ANYTHING, OR PREPARED TO DO ANYTHING TO END YOUR LIFE?: NO

## 2025-04-22 NOTE — PROGRESS NOTES
[facility] [place] [facility]  [facility] PSYCHIATRY  [address] [facility] [address] [phone]    Progress Note    Patient:  Evans Page  YOB: 1955  PCP:  Jose Reynoso DO  Visit Date:  4/22/2025      Chief Complaint   Patient presents with    Follow-up    Medication Check    Depression    Anxiety    Chronic Pain       SUBJECTIVE:      Evans Page, a 69 y.o. male, presents for a follow up visit.     He presents with his SO, Jennifer, in the background.  Likes nicer weather, getting out more.   Helps mood and overall mental health. Spent a lot of time indoors this winter.   Mood \"okay for the most part\".   Endorses anhedonia, fatigue, trouble focusing.  Sleep is primary concern.   Still some restlessness at night. Notes his goal to get 6 hours uninterrupted sleep but has been getting 2-3 hours at a time.   Injections have had more benefit for back pain. Hopes to avoid doing more nerve ablations.   Denies any suicidal thoughts \"I used to back in the day. That doesn't even cross my mind.\" Denies even passive thoughts of death despite PHQ-9 answer. I asked whether he answered those, apparently was answered in error.   No psychosis.   Endorses dizziness with standing too quickly, occasionally, usually when getting OOB, not daily. Discussed that Elavil might cause that.   Declines to make any medication changes today.       Med Trials:Cymbalta (fatigue? No benefit), Elavil, trazodone, Ambien, Wellbutrin, Zoloft, Paxil, Prozac (HA, anxiety, CP)    OBJECTIVE:  Vitals: There were no vitals taken for this visit.    MENTAL STATUS EXAM:    GENERAL  Build: Overweight    Hygiene:  Appropriate    SENSORIUM Orientation: Place, Person, Time, & Situation     Consciousness: Alert    ATTENTION   Focused    RELATEDNESS  Cooperative    EYE CONTACT   Good    PSYCHOMOTOR  Normal    SPEECH Volume: Normal     Rate: Normal rate and tone    Amplitude: Within normal limits

## 2025-07-04 DIAGNOSIS — E11.9 TYPE 2 DIABETES MELLITUS WITHOUT COMPLICATION, WITHOUT LONG-TERM CURRENT USE OF INSULIN (HCC): ICD-10-CM

## 2025-07-04 DIAGNOSIS — E11.65 TYPE 2 DIABETES MELLITUS WITH HYPERGLYCEMIA, WITHOUT LONG-TERM CURRENT USE OF INSULIN (HCC): ICD-10-CM

## 2025-07-07 NOTE — TELEPHONE ENCOUNTER
Patient was contacted and patient significant other states they will give us a call back when they get better service to schedule apt with Dr ARMANDO Klein

## 2025-07-07 NOTE — TELEPHONE ENCOUNTER
Patient's last appointment was: 3/4/2025  with our   Patient's next appointment is: Visit date not found    Last refilled on: 7/12/2024  Which pharmacy does the script need sent to: [facility]      Lab Results   Component Value Date    LABA1C 9.8 03/04/2025     Lab Results   Component Value Date    CHOL 190 10/03/2019    TRIG 146 10/03/2019    HDL 40 06/08/2024     Lab Results   Component Value Date     04/30/2024    K 4.2 04/30/2024    CL 99 04/30/2024    CO2 25 04/30/2024    BUN 12 04/30/2024    CREATININE 0.7 04/30/2024    GLUCOSE 355 (H) 04/30/2024    CALCIUM 9.2 04/30/2024    BILITOT 0.5 10/03/2019    ALKPHOS 129 10/03/2019    AST 17 10/03/2019    ALT 26 10/03/2019    LABGLOM >90 04/30/2024     Lab Results   Component Value Date    TSH 2.540 06/08/2024     Lab Results   Component Value Date    WBC 10.8 04/30/2024    HGB 15.2 04/30/2024    HCT 44.4 04/30/2024    MCV 89.2 04/30/2024     04/30/2024

## 2025-07-07 NOTE — TELEPHONE ENCOUNTER
Patient should make an appointment to be seen by provider.     An electronic signature was used to authenticate this note  - Denisse Chow MD PGY-3 on 7/7/2025 at 9:11 AM

## 2025-08-19 ASSESSMENT — ANXIETY QUESTIONNAIRES
1. FEELING NERVOUS, ANXIOUS, OR ON EDGE: SEVERAL DAYS
1. FEELING NERVOUS, ANXIOUS, OR ON EDGE: SEVERAL DAYS

## 2025-08-19 ASSESSMENT — PATIENT HEALTH QUESTIONNAIRE - PHQ9
4. FEELING TIRED OR HAVING LITTLE ENERGY: SEVERAL DAYS
8. MOVING OR SPEAKING SO SLOWLY THAT OTHER PEOPLE COULD HAVE NOTICED. OR THE OPPOSITE - BEING SO FIDGETY OR RESTLESS THAT YOU HAVE BEEN MOVING AROUND A LOT MORE THAN USUAL: NOT AT ALL
5. POOR APPETITE OR OVEREATING: SEVERAL DAYS
7. TROUBLE CONCENTRATING ON THINGS, SUCH AS READING THE NEWSPAPER OR WATCHING TELEVISION: SEVERAL DAYS
3. TROUBLE FALLING OR STAYING ASLEEP: NEARLY EVERY DAY
SUM OF ALL RESPONSES TO PHQ QUESTIONS 1-9: 10
2. FEELING DOWN, DEPRESSED OR HOPELESS: SEVERAL DAYS
9. THOUGHTS THAT YOU WOULD BE BETTER OFF DEAD, OR OF HURTING YOURSELF: NOT AT ALL
9. THOUGHTS THAT YOU WOULD BE BETTER OFF DEAD, OR OF HURTING YOURSELF: NOT AT ALL
3. TROUBLE FALLING OR STAYING ASLEEP: NEARLY EVERY DAY
8. MOVING OR SPEAKING SO SLOWLY THAT OTHER PEOPLE COULD HAVE NOTICED. OR THE OPPOSITE, BEING SO FIGETY OR RESTLESS THAT YOU HAVE BEEN MOVING AROUND A LOT MORE THAN USUAL: NOT AT ALL
6. FEELING BAD ABOUT YOURSELF - OR THAT YOU ARE A FAILURE OR HAVE LET YOURSELF OR YOUR FAMILY DOWN: SEVERAL DAYS
1. LITTLE INTEREST OR PLEASURE IN DOING THINGS: MORE THAN HALF THE DAYS
1. LITTLE INTEREST OR PLEASURE IN DOING THINGS: MORE THAN HALF THE DAYS
5. POOR APPETITE OR OVEREATING: SEVERAL DAYS
10. IF YOU CHECKED OFF ANY PROBLEMS, HOW DIFFICULT HAVE THESE PROBLEMS MADE IT FOR YOU TO DO YOUR WORK, TAKE CARE OF THINGS AT HOME, OR GET ALONG WITH OTHER PEOPLE: SOMEWHAT DIFFICULT
SUM OF ALL RESPONSES TO PHQ QUESTIONS 1-9: 10
6. FEELING BAD ABOUT YOURSELF - OR THAT YOU ARE A FAILURE OR HAVE LET YOURSELF OR YOUR FAMILY DOWN: SEVERAL DAYS
SUM OF ALL RESPONSES TO PHQ QUESTIONS 1-9: 10
SUM OF ALL RESPONSES TO PHQ QUESTIONS 1-9: 10
7. TROUBLE CONCENTRATING ON THINGS, SUCH AS READING THE NEWSPAPER OR WATCHING TELEVISION: SEVERAL DAYS
10. IF YOU CHECKED OFF ANY PROBLEMS, HOW DIFFICULT HAVE THESE PROBLEMS MADE IT FOR YOU TO DO YOUR WORK, TAKE CARE OF THINGS AT HOME, OR GET ALONG WITH OTHER PEOPLE: SOMEWHAT DIFFICULT
SUM OF ALL RESPONSES TO PHQ QUESTIONS 1-9: 10
2. FEELING DOWN, DEPRESSED OR HOPELESS: SEVERAL DAYS
4. FEELING TIRED OR HAVING LITTLE ENERGY: SEVERAL DAYS

## 2025-08-20 ENCOUNTER — TELEMEDICINE (OUTPATIENT)
Dept: PSYCHIATRY | Age: 70
End: 2025-08-20
Payer: MEDICARE

## 2025-08-20 DIAGNOSIS — F32.9 CURRENT EPISODE OF MAJOR DEPRESSIVE DISORDER WITHOUT PRIOR EPISODE, UNSPECIFIED DEPRESSION EPISODE SEVERITY: Primary | ICD-10-CM

## 2025-08-20 DIAGNOSIS — F41.9 ANXIETY: ICD-10-CM

## 2025-08-20 DIAGNOSIS — G89.21 CHRONIC PAIN DUE TO TRAUMA: ICD-10-CM

## 2025-08-20 PROCEDURE — G8428 CUR MEDS NOT DOCUMENT: HCPCS | Performed by: PSYCHIATRY & NEUROLOGY

## 2025-08-20 PROCEDURE — 3017F COLORECTAL CA SCREEN DOC REV: CPT | Performed by: PSYCHIATRY & NEUROLOGY

## 2025-08-20 PROCEDURE — 99214 OFFICE O/P EST MOD 30 MIN: CPT | Performed by: PSYCHIATRY & NEUROLOGY

## 2025-08-20 PROCEDURE — 1123F ACP DISCUSS/DSCN MKR DOCD: CPT | Performed by: PSYCHIATRY & NEUROLOGY

## 2025-08-20 RX ORDER — MIRTAZAPINE 7.5 MG/1
7.5 TABLET, FILM COATED ORAL NIGHTLY
Qty: 30 TABLET | Refills: 2 | Status: SHIPPED | OUTPATIENT
Start: 2025-08-20

## 2025-08-20 RX ORDER — BUPROPION HYDROCHLORIDE 150 MG/1
150 TABLET ORAL EVERY MORNING
Qty: 30 TABLET | Refills: 5 | Status: SHIPPED | OUTPATIENT
Start: 2025-08-20